# Patient Record
Sex: FEMALE | Race: BLACK OR AFRICAN AMERICAN | Employment: FULL TIME | URBAN - METROPOLITAN AREA
[De-identification: names, ages, dates, MRNs, and addresses within clinical notes are randomized per-mention and may not be internally consistent; named-entity substitution may affect disease eponyms.]

---

## 2021-07-26 ENCOUNTER — HOSPITAL ENCOUNTER (OUTPATIENT)
Dept: PREADMISSION TESTING | Age: 46
Discharge: HOME | End: 2021-07-26
Payer: SELF-PAY

## 2021-07-26 VITALS
BODY MASS INDEX: 37.77 KG/M2 | OXYGEN SATURATION: 100 % | WEIGHT: 235 LBS | DIASTOLIC BLOOD PRESSURE: 80 MMHG | HEART RATE: 65 BPM | SYSTOLIC BLOOD PRESSURE: 121 MMHG | HEIGHT: 66 IN | TEMPERATURE: 96.9 F | RESPIRATION RATE: 20 BRPM

## 2021-07-26 LAB
ALBUMIN SERPL-MCNC: 3.7 G/DL (ref 3.5–5)
ALBUMIN/GLOB SERPL: 1.2 {RATIO} (ref 1.1–2.2)
ALP SERPL-CCNC: 53 U/L (ref 45–117)
ALT SERPL-CCNC: 17 U/L (ref 12–78)
ANION GAP SERPL CALC-SCNC: 2 MMOL/L (ref 5–15)
APTT PPP: 26.2 SEC (ref 22.1–31)
AST SERPL-CCNC: 10 U/L (ref 15–37)
ATRIAL RATE: 55 BPM
BASOPHILS # BLD: 0.1 K/UL (ref 0–0.1)
BASOPHILS NFR BLD: 1 % (ref 0–1)
BILIRUB SERPL-MCNC: 0.3 MG/DL (ref 0.2–1)
BUN SERPL-MCNC: 11 MG/DL (ref 6–20)
BUN/CREAT SERPL: 13 (ref 12–20)
CALCIUM SERPL-MCNC: 8.5 MG/DL (ref 8.5–10.1)
CALCULATED P AXIS, ECG09: 44 DEGREES
CALCULATED R AXIS, ECG10: 36 DEGREES
CALCULATED T AXIS, ECG11: 17 DEGREES
CHLORIDE SERPL-SCNC: 109 MMOL/L (ref 97–108)
CO2 SERPL-SCNC: 29 MMOL/L (ref 21–32)
CREAT SERPL-MCNC: 0.84 MG/DL (ref 0.55–1.02)
DIAGNOSIS, 93000: NORMAL
DIFFERENTIAL METHOD BLD: NORMAL
EOSINOPHIL # BLD: 0.3 K/UL (ref 0–0.4)
EOSINOPHIL NFR BLD: 5 % (ref 0–7)
ERYTHROCYTE [DISTWIDTH] IN BLOOD BY AUTOMATED COUNT: 12 % (ref 11.5–14.5)
GLOBULIN SER CALC-MCNC: 3.1 G/DL (ref 2–4)
GLUCOSE SERPL-MCNC: 81 MG/DL (ref 65–100)
HCT VFR BLD AUTO: 39.2 % (ref 35–47)
HGB BLD-MCNC: 12.4 G/DL (ref 11.5–16)
IMM GRANULOCYTES # BLD AUTO: 0 K/UL (ref 0–0.04)
IMM GRANULOCYTES NFR BLD AUTO: 0 % (ref 0–0.5)
INR PPP: 1 (ref 0.9–1.1)
LYMPHOCYTES # BLD: 3 K/UL (ref 0.8–3.5)
LYMPHOCYTES NFR BLD: 43 % (ref 12–49)
MCH RBC QN AUTO: 29.9 PG (ref 26–34)
MCHC RBC AUTO-ENTMCNC: 31.6 G/DL (ref 30–36.5)
MCV RBC AUTO: 94.5 FL (ref 80–99)
MONOCYTES # BLD: 0.5 K/UL (ref 0–1)
MONOCYTES NFR BLD: 7 % (ref 5–13)
NEUTS SEG # BLD: 3 K/UL (ref 1.8–8)
NEUTS SEG NFR BLD: 44 % (ref 32–75)
NRBC # BLD: 0 K/UL (ref 0–0.01)
NRBC BLD-RTO: 0 PER 100 WBC
P-R INTERVAL, ECG05: 174 MS
PLATELET # BLD AUTO: 289 K/UL (ref 150–400)
PMV BLD AUTO: 10.1 FL (ref 8.9–12.9)
POTASSIUM SERPL-SCNC: 4.8 MMOL/L (ref 3.5–5.1)
PROT SERPL-MCNC: 6.8 G/DL (ref 6.4–8.2)
PROTHROMBIN TIME: 10.7 SEC (ref 9–11.1)
Q-T INTERVAL, ECG07: 430 MS
QRS DURATION, ECG06: 94 MS
QTC CALCULATION (BEZET), ECG08: 411 MS
RBC # BLD AUTO: 4.15 M/UL (ref 3.8–5.2)
SODIUM SERPL-SCNC: 140 MMOL/L (ref 136–145)
THERAPEUTIC RANGE,PTTT: NORMAL SECS (ref 58–77)
VENTRICULAR RATE, ECG03: 55 BPM
WBC # BLD AUTO: 6.9 K/UL (ref 3.6–11)

## 2021-07-26 PROCEDURE — 80053 COMPREHEN METABOLIC PANEL: CPT

## 2021-07-26 PROCEDURE — 85025 COMPLETE CBC W/AUTO DIFF WBC: CPT

## 2021-07-26 PROCEDURE — 93005 ELECTROCARDIOGRAM TRACING: CPT

## 2021-07-26 PROCEDURE — 85730 THROMBOPLASTIN TIME PARTIAL: CPT

## 2021-07-26 PROCEDURE — 36415 COLL VENOUS BLD VENIPUNCTURE: CPT

## 2021-07-26 PROCEDURE — 85610 PROTHROMBIN TIME: CPT

## 2021-07-26 RX ORDER — LEVOCETIRIZINE DIHYDROCHLORIDE 5 MG/1
5 TABLET, FILM COATED ORAL DAILY
COMMUNITY

## 2021-07-26 RX ORDER — GLUCOSAMINE SULFATE 1500 MG
2000 POWDER IN PACKET (EA) ORAL DAILY
COMMUNITY

## 2021-07-26 RX ORDER — ALBUTEROL SULFATE 90 UG/1
2 AEROSOL, METERED RESPIRATORY (INHALATION)
COMMUNITY

## 2021-07-26 NOTE — H&P
History and Physical    Patient: Dalila Adan MRN: [id number]  SSN: [id number]    YOB: 1975  Age: 41 y.o. Sex: female      CC: Cosmetic    Subjective: Dalila Adan is a 41 y.o. female referred for pre-operative evaluation by Dr. Tram Urbano for surgery on 21. Tammy Bhatt has a history of gastric sleeve surgery in 2018 and she now has excess skin around her abdominal area. She wishes to proceed with surgical intervention. She denies any cardiac history, no CP or SOB. She has mild asthma. The patient was evaluated in the surgeon's office and it was determined that the most appropriate plan of care is to proceed with surgical intervention. Patient's PCP Other, MD Randy    Past Medical History:   Diagnosis Date    COVID-19 vaccine series completed     Mild asthma      Past Surgical History:   Procedure Laterality Date    HX  SECTION      x 2    HX HYSTEROSCOPY WITH ENDOMETRIAL ABLATION      HX LAP GASTRIC BYPASS  2018      Family History   Problem Relation Age of Onset    Anesth Problems Neg Hx      Social History     Tobacco Use    Smoking status: Never Smoker    Smokeless tobacco: Never Used   Substance Use Topics    Alcohol use: Yes     Comment: occ    Drug use: Never      Prior to Admission medications    Medication Sig Start Date End Date Taking? Authorizing Provider   albuterol (PROVENTIL HFA, VENTOLIN HFA, PROAIR HFA) 90 mcg/actuation inhaler Take 2 Puffs by inhalation every six (6) hours as needed for Wheezing. Yes Provider, Historical   levocetirizine (Xyzal) 5 mg tablet Take 5 mg by mouth daily. Yes Provider, Historical   cholecalciferol (Vitamin D3) 25 mcg (1,000 unit) cap Take 2,000 Units by mouth daily.    Yes Provider, Historical        Allergies   Allergen Reactions    Nsaids (Non-Steroidal Anti-Inflammatory Drug) Other (comments)     Previous bariatric surgery 2018       Review of Systems:  Constitutional: Negative for chills and fever  HENT: Negative for congestion and sore throat  Eyes: negative for blurred vision and double vision  Respiratory: Negative for cough, shortness of breath and wheezing  Mouth: Negative for loose, broken or chipped teeth. Cardiovascular: Negative for chest pain and palpitations  Gastrointestinal: Negative for abdominal pain, constipation, diarrhea and nausea  Genitourinary: Negative for dysuria and hematuria  Musculoskeletal: Negative for joint pain  Skin: Negative for rash, open wounds. Neurological: Negative for dizziness, tremors and headaches  Psychiatric: Negative for anxiety    Objective:     Vitals:    07/26/21 1314   BP: 121/80   Pulse: 65   Resp: 20   Temp: 96.9 °F (36.1 °C)   SpO2: 100%   Weight: 106.6 kg (235 lb)   Height: 5' 6\" (1.676 m)        Physical Exam:  General Appearance: Alert, Well Appearing and in no distress  Mental Status: Normal mood, behavior, speech and dress  Neck: Normal appearance externally  Ears: External canal no drainage  Nose: Normal external appearance and no drainage   Chest: Clear to auscultation, no wheezes, rales or rhonchi  Heart: Normal rate, regular rhythm, no murmurs, rubs, clicks or gallops  Skin: Normal color, no lesions externally  Abdomen: Not examined  Neuro: Not examined  Musculoskeletal: Normal gait    Recent Results (from the past 36 hour(s))   CBC WITH AUTOMATED DIFF    Collection Time: 07/26/21  1:54 PM   Result Value Ref Range    WBC 6.9 3.6 - 11.0 K/uL    RBC 4.15 3.80 - 5.20 M/uL    HGB 12.4 11.5 - 16.0 g/dL    HCT 39.2 35.0 - 47.0 %    MCV 94.5 80.0 - 99.0 FL    MCH 29.9 26.0 - 34.0 PG    MCHC 31.6 30.0 - 36.5 g/dL    RDW 12.0 11.5 - 14.5 %    PLATELET 487 239 - 995 K/uL    MPV 10.1 8.9 - 12.9 FL    NRBC 0.0 0  WBC    ABSOLUTE NRBC 0.00 0.00 - 0.01 K/uL    NEUTROPHILS 44 32 - 75 %    LYMPHOCYTES 43 12 - 49 %    MONOCYTES 7 5 - 13 %    EOSINOPHILS 5 0 - 7 %    BASOPHILS 1 0 - 1 %    IMMATURE GRANULOCYTES 0 0.0 - 0.5 %    ABS.  NEUTROPHILS 3.0 1.8 - 8.0 K/UL ABS. LYMPHOCYTES 3.0 0.8 - 3.5 K/UL    ABS. MONOCYTES 0.5 0.0 - 1.0 K/UL    ABS. EOSINOPHILS 0.3 0.0 - 0.4 K/UL    ABS. BASOPHILS 0.1 0.0 - 0.1 K/UL    ABS. IMM. GRANS. 0.0 0.00 - 0.04 K/UL    DF AUTOMATED     METABOLIC PANEL, COMPREHENSIVE    Collection Time: 07/26/21  1:54 PM   Result Value Ref Range    Sodium 140 136 - 145 mmol/L    Potassium 4.8 3.5 - 5.1 mmol/L    Chloride 109 (H) 97 - 108 mmol/L    CO2 29 21 - 32 mmol/L    Anion gap 2 (L) 5 - 15 mmol/L    Glucose 81 65 - 100 mg/dL    BUN 11 6 - 20 MG/DL    Creatinine 0.84 0.55 - 1.02 MG/DL    BUN/Creatinine ratio 13 12 - 20      GFR est AA >60 >60 ml/min/1.73m2    GFR est non-AA >60 >60 ml/min/1.73m2    Calcium 8.5 8.5 - 10.1 MG/DL    Bilirubin, total 0.3 0.2 - 1.0 MG/DL    ALT (SGPT) 17 12 - 78 U/L    AST (SGOT) 10 (L) 15 - 37 U/L    Alk. phosphatase 53 45 - 117 U/L    Protein, total 6.8 6.4 - 8.2 g/dL    Albumin 3.7 3.5 - 5.0 g/dL    Globulin 3.1 2.0 - 4.0 g/dL    A-G Ratio 1.2 1.1 - 2.2     PROTHROMBIN TIME + INR    Collection Time: 07/26/21  1:54 PM   Result Value Ref Range    INR 1.0 0.9 - 1.1      Prothrombin time 10.7 9.0 - 11.1 sec   PTT    Collection Time: 07/26/21  1:54 PM   Result Value Ref Range    aPTT 26.2 22.1 - 31.0 sec    aPTT, therapeutic range     58.0 - 77.0 SECS   EKG, 12 LEAD, INITIAL    Collection Time: 07/26/21  2:25 PM   Result Value Ref Range    Ventricular Rate 55 BPM    Atrial Rate 55 BPM    P-R Interval 174 ms    QRS Duration 94 ms    Q-T Interval 430 ms    QTC Calculation (Bezet) 411 ms    Calculated P Axis 44 degrees    Calculated R Axis 36 degrees    Calculated T Axis 17 degrees    Diagnosis       Sinus bradycardia with sinus arrhythmia  Cannot rule out Anterior infarct , age undetermined  Abnormal ECG  No previous ECGs available         Assessment:     Cosmetic  Pre-Operative Evaluation    Plan:     Abdominoplasty  Labs and EKG reviewed.        Signed By: Bonita Hylton NP     July 26, 2021

## 2021-07-26 NOTE — PERIOP NOTES
[address] [address] [facility]   MAIN OR                                  [phone]   MAIN PRE OP                          [phone]                                                                                AMBULATORY PRE OP          [phone]  PRE-ADMISSION TESTING    [phone]   Surgery Date:  8/4/2021       Is surgery arrival time given by surgeon? YES  NO  If NO, [facility] staff will call you between 3 and 7pm the day before your surgery with your arrival time. (If your surgery is on a Monday, we will call you the Friday before.)    Call [phone] after 7pm Monday-Friday if you did not receive this call. INSTRUCTIONS BEFORE YOUR SURGERY   When You  Arrive Arrive at the [address] [facility] on the day of your surgery  Have your insurance card, photo ID, and any copayment (if needed)   Food   and   Drink NO food or drink after midnight the night before surgery    This means NO water, gum, mints, coffee, juice, etc.  No alcohol (beer, wine, liquor) 24 hours before and after surgery   Medications to   TAKE   Morning of Surgery MEDICATIONS TO TAKE THE MORNING OF SURGERY WITH A SIP OF WATER:    Bring inhaler   Medications  To  STOP      7 days before surgery  Non-Steroidal anti-inflammatory Drugs (NSAID's): for example, Ibuprofen (Advil, Motrin), Naproxen (Aleve)   Aspirin, if taking for pain    Herbal supplements, vitamins, and fish oil   Other:  (Pain medications not listed above, including Tylenol may be taken)   Blood  Thinners  If you take  Aspirin, Plavix, Coumadin, or any blood-thinning or anti-blood clot medicine, talk to the doctor who prescribed the medications for pre-operative instructions.    Bathing Clothing  Jewelry  Valuables      If you shower the morning of surgery, please do not apply anything to your skin (lotions, powders, deodorant, or makeup, especially mascara)   Follow [employer]lor[employer] body wash instructions provided to you during PAT appointment. Begin 3 days prior to surgery.  Do not shave or trim anywhere 24 hours before surgery   Wear your hair loose or down; no pony-tails, buns, or metal hair clips   Wear loose, comfortable, clean clothes   Wear glasses instead of contacts  [employer] money, valuables, and jewelry, including body piercings, at home   If you were given an [employer], bring it on day of surgery. Going Home - or Spending the Night  SAME-DAY SURGERY: You must have a responsible adult drive you home and stay with you 24 hours after surgery   ADMITS: If your doctor is keeping you in the hospital after surgery, leave personal belongings/luggage in your car until you have a hospital room number. Hospital discharge time is 12 noon  Drivers must be here before 12 noon unless you are told differently   Special Instructions none       Follow all instructions so your surgery wont be cancelled. Please, be on time. If a situation occurs and you are delayed the day of surgery, call [phone] . If your physical condition changes (like a fever, cold, flu, etc.) call your surgeon. Home medication(s) reviewed and verified via     LIST    during PAT appointment. The patient was contacted by     IN-PERSON  The patient verbalizes understanding of all instructions and     DOES NOT   need reinforcement.

## 2021-08-03 ENCOUNTER — ANESTHESIA EVENT (OUTPATIENT)
Dept: SURGERY | Age: 46
End: 2021-08-03
Payer: SELF-PAY

## 2021-08-04 ENCOUNTER — HOSPITAL ENCOUNTER (OUTPATIENT)
Age: 46
Discharge: HOME | End: 2021-08-05
Attending: SURGERY | Admitting: SURGERY
Payer: SELF-PAY

## 2021-08-04 ENCOUNTER — ANESTHESIA (OUTPATIENT)
Dept: SURGERY | Age: 46
End: 2021-08-04
Payer: SELF-PAY

## 2021-08-04 PROBLEM — Z41.1 ENCOUNTER FOR COSMETIC SURGERY: Status: ACTIVE | Noted: 2021-08-04

## 2021-08-04 LAB — HCG UR QL: NEGATIVE

## 2021-08-04 PROCEDURE — 77030013079 HC BLNKT BAIR HGGR 3M -A: Performed by: ANESTHESIOLOGY

## 2021-08-04 PROCEDURE — 74011000250 HC RX REV CODE- 250: Performed by: SURGERY

## 2021-08-04 PROCEDURE — 77030040361 HC SLV COMPR DVT MDII -B

## 2021-08-04 PROCEDURE — 74011250637 HC RX REV CODE- 250/637: Performed by: SURGERY

## 2021-08-04 PROCEDURE — 88302 TISSUE EXAM BY PATHOLOGIST: CPT

## 2021-08-04 PROCEDURE — 74011250636 HC RX REV CODE- 250/636: Performed by: SURGERY

## 2021-08-04 PROCEDURE — 77030008534 HC TBNG LIPOSUC BYRO -B: Performed by: SURGERY

## 2021-08-04 PROCEDURE — 77030038552 HC DRN WND MDII -A: Performed by: SURGERY

## 2021-08-04 PROCEDURE — 74011250636 HC RX REV CODE- 250/636: Performed by: NURSE ANESTHETIST, CERTIFIED REGISTERED

## 2021-08-04 PROCEDURE — 77030002916 HC SUT ETHLN J&J -A: Performed by: SURGERY

## 2021-08-04 PROCEDURE — 77030002933 HC SUT MCRYL J&J -A: Performed by: SURGERY

## 2021-08-04 PROCEDURE — 76060000037 HC ANESTHESIA 3 TO 3.5 HR: Performed by: SURGERY

## 2021-08-04 PROCEDURE — 81025 URINE PREGNANCY TEST: CPT

## 2021-08-04 PROCEDURE — 77030008463 HC STPLR SKN PROX J&J -B: Performed by: SURGERY

## 2021-08-04 PROCEDURE — 76210000000 HC OR PH I REC 2 TO 2.5 HR: Performed by: SURGERY

## 2021-08-04 PROCEDURE — 77030031139 HC SUT VCRL2 J&J -A: Performed by: SURGERY

## 2021-08-04 PROCEDURE — 77030019940 HC BLNKT HYPOTHRM STRY -B: Performed by: SURGERY

## 2021-08-04 PROCEDURE — 77030040922 HC BLNKT HYPOTHRM STRY -A

## 2021-08-04 PROCEDURE — 77030008684 HC TU ET CUF COVD -B: Performed by: NURSE ANESTHETIST, CERTIFIED REGISTERED

## 2021-08-04 PROCEDURE — 76010000133 HC OR TIME 3 TO 3.5 HR: Performed by: SURGERY

## 2021-08-04 PROCEDURE — C9290 INJ, BUPIVACAINE LIPOSOME: HCPCS | Performed by: SURGERY

## 2021-08-04 PROCEDURE — 77030002986 HC SUT PROL J&J -A: Performed by: SURGERY

## 2021-08-04 PROCEDURE — 77030038692 HC WND DEB SYS IRMX -B: Performed by: SURGERY

## 2021-08-04 PROCEDURE — 77030028700 HC BLD TISS PLSM MEDT -E: Performed by: SURGERY

## 2021-08-04 PROCEDURE — 74011250636 HC RX REV CODE- 250/636: Performed by: ANESTHESIOLOGY

## 2021-08-04 PROCEDURE — 74011000258 HC RX REV CODE- 258: Performed by: SURGERY

## 2021-08-04 PROCEDURE — 77030020061 HC IV BLD WRMR ADMIN SET 3M -B: Performed by: ANESTHESIOLOGY

## 2021-08-04 PROCEDURE — 77030002966 HC SUT PDS J&J -A: Performed by: SURGERY

## 2021-08-04 PROCEDURE — 77030036554: Performed by: SURGERY

## 2021-08-04 PROCEDURE — 77030005513 HC CATH URETH FOL11 MDII -B: Performed by: SURGERY

## 2021-08-04 PROCEDURE — 77030026438 HC STYL ET INTUB CARD -A: Performed by: NURSE ANESTHETIST, CERTIFIED REGISTERED

## 2021-08-04 PROCEDURE — 74011000250 HC RX REV CODE- 250: Performed by: NURSE ANESTHETIST, CERTIFIED REGISTERED

## 2021-08-04 PROCEDURE — 2709999900 HC NON-CHARGEABLE SUPPLY: Performed by: SURGERY

## 2021-08-04 PROCEDURE — 77030008526 HC TBNG ASPIR DISP MCRA -B: Performed by: SURGERY

## 2021-08-04 RX ORDER — DIPHENHYDRAMINE HYDROCHLORIDE 50 MG/ML
12.5 INJECTION, SOLUTION INTRAMUSCULAR; INTRAVENOUS AS NEEDED
Status: DISCONTINUED | OUTPATIENT
Start: 2021-08-04 | End: 2021-08-04 | Stop reason: HOSPADM

## 2021-08-04 RX ORDER — NEOSTIGMINE METHYLSULFATE 1 MG/ML
INJECTION, SOLUTION INTRAVENOUS AS NEEDED
Status: DISCONTINUED | OUTPATIENT
Start: 2021-08-04 | End: 2021-08-04 | Stop reason: HOSPADM

## 2021-08-04 RX ORDER — NALOXONE HYDROCHLORIDE 0.4 MG/ML
0.4 INJECTION, SOLUTION INTRAMUSCULAR; INTRAVENOUS; SUBCUTANEOUS AS NEEDED
Status: DISCONTINUED | OUTPATIENT
Start: 2021-08-04 | End: 2021-08-05 | Stop reason: HOSPADM

## 2021-08-04 RX ORDER — SODIUM CHLORIDE, SODIUM LACTATE, POTASSIUM CHLORIDE, CALCIUM CHLORIDE 600; 310; 30; 20 MG/100ML; MG/100ML; MG/100ML; MG/100ML
150 INJECTION, SOLUTION INTRAVENOUS CONTINUOUS
Status: DISCONTINUED | OUTPATIENT
Start: 2021-08-04 | End: 2021-08-04 | Stop reason: HOSPADM

## 2021-08-04 RX ORDER — OXYCODONE HYDROCHLORIDE 5 MG/1
5 TABLET ORAL
Status: DISCONTINUED | OUTPATIENT
Start: 2021-08-04 | End: 2021-08-05 | Stop reason: HOSPADM

## 2021-08-04 RX ORDER — MIDAZOLAM HYDROCHLORIDE 1 MG/ML
INJECTION, SOLUTION INTRAMUSCULAR; INTRAVENOUS AS NEEDED
Status: DISCONTINUED | OUTPATIENT
Start: 2021-08-04 | End: 2021-08-04 | Stop reason: HOSPADM

## 2021-08-04 RX ORDER — ENOXAPARIN SODIUM 100 MG/ML
40 INJECTION SUBCUTANEOUS EVERY 24 HOURS
Status: DISCONTINUED | OUTPATIENT
Start: 2021-08-04 | End: 2021-08-04

## 2021-08-04 RX ORDER — ONDANSETRON 2 MG/ML
4 INJECTION INTRAMUSCULAR; INTRAVENOUS
Status: DISCONTINUED | OUTPATIENT
Start: 2021-08-04 | End: 2021-08-05 | Stop reason: HOSPADM

## 2021-08-04 RX ORDER — FAMOTIDINE 10 MG/ML
INJECTION INTRAVENOUS AS NEEDED
Status: DISCONTINUED | OUTPATIENT
Start: 2021-08-04 | End: 2021-08-04 | Stop reason: HOSPADM

## 2021-08-04 RX ORDER — FENTANYL CITRATE 50 UG/ML
INJECTION, SOLUTION INTRAMUSCULAR; INTRAVENOUS AS NEEDED
Status: DISCONTINUED | OUTPATIENT
Start: 2021-08-04 | End: 2021-08-04 | Stop reason: HOSPADM

## 2021-08-04 RX ORDER — ACETAMINOPHEN 500 MG
1000 TABLET ORAL EVERY 6 HOURS
Status: DISCONTINUED | OUTPATIENT
Start: 2021-08-04 | End: 2021-08-05 | Stop reason: HOSPADM

## 2021-08-04 RX ORDER — GLYCOPYRROLATE 0.2 MG/ML
INJECTION INTRAMUSCULAR; INTRAVENOUS AS NEEDED
Status: DISCONTINUED | OUTPATIENT
Start: 2021-08-04 | End: 2021-08-04 | Stop reason: HOSPADM

## 2021-08-04 RX ORDER — SODIUM CHLORIDE 0.9 % (FLUSH) 0.9 %
5-40 SYRINGE (ML) INJECTION EVERY 8 HOURS
Status: DISCONTINUED | OUTPATIENT
Start: 2021-08-04 | End: 2021-08-05 | Stop reason: HOSPADM

## 2021-08-04 RX ORDER — LIDOCAINE HYDROCHLORIDE 10 MG/ML
0.1 INJECTION, SOLUTION EPIDURAL; INFILTRATION; INTRACAUDAL; PERINEURAL AS NEEDED
Status: DISCONTINUED | OUTPATIENT
Start: 2021-08-04 | End: 2021-08-04 | Stop reason: HOSPADM

## 2021-08-04 RX ORDER — DIPHENHYDRAMINE HYDROCHLORIDE 50 MG/ML
12.5 INJECTION, SOLUTION INTRAMUSCULAR; INTRAVENOUS
Status: DISCONTINUED | OUTPATIENT
Start: 2021-08-04 | End: 2021-08-05 | Stop reason: HOSPADM

## 2021-08-04 RX ORDER — PROPOFOL 10 MG/ML
INJECTION, EMULSION INTRAVENOUS AS NEEDED
Status: DISCONTINUED | OUTPATIENT
Start: 2021-08-04 | End: 2021-08-04 | Stop reason: HOSPADM

## 2021-08-04 RX ORDER — LIDOCAINE HYDROCHLORIDE 20 MG/ML
INJECTION, SOLUTION EPIDURAL; INFILTRATION; INTRACAUDAL; PERINEURAL AS NEEDED
Status: DISCONTINUED | OUTPATIENT
Start: 2021-08-04 | End: 2021-08-04 | Stop reason: HOSPADM

## 2021-08-04 RX ORDER — ALBUTEROL SULFATE 0.83 MG/ML
2.5 SOLUTION RESPIRATORY (INHALATION) AS NEEDED
Status: DISCONTINUED | OUTPATIENT
Start: 2021-08-04 | End: 2021-08-04 | Stop reason: HOSPADM

## 2021-08-04 RX ORDER — HYDROMORPHONE HYDROCHLORIDE 2 MG/ML
INJECTION, SOLUTION INTRAMUSCULAR; INTRAVENOUS; SUBCUTANEOUS AS NEEDED
Status: DISCONTINUED | OUTPATIENT
Start: 2021-08-04 | End: 2021-08-04 | Stop reason: HOSPADM

## 2021-08-04 RX ORDER — SUCCINYLCHOLINE CHLORIDE 20 MG/ML
INJECTION INTRAMUSCULAR; INTRAVENOUS AS NEEDED
Status: DISCONTINUED | OUTPATIENT
Start: 2021-08-04 | End: 2021-08-04 | Stop reason: HOSPADM

## 2021-08-04 RX ORDER — HYDROMORPHONE HYDROCHLORIDE 1 MG/ML
0.5 INJECTION, SOLUTION INTRAMUSCULAR; INTRAVENOUS; SUBCUTANEOUS
Status: DISCONTINUED | OUTPATIENT
Start: 2021-08-04 | End: 2021-08-05 | Stop reason: HOSPADM

## 2021-08-04 RX ORDER — HYDROMORPHONE HCL/0.9% NACL/PF 0.5 MG/ML
PLASTIC BAG, INJECTION (ML) INTRAVENOUS CONTINUOUS
Status: DISCONTINUED | OUTPATIENT
Start: 2021-08-04 | End: 2021-08-05 | Stop reason: HOSPADM

## 2021-08-04 RX ORDER — ROCURONIUM BROMIDE 10 MG/ML
INJECTION, SOLUTION INTRAVENOUS AS NEEDED
Status: DISCONTINUED | OUTPATIENT
Start: 2021-08-04 | End: 2021-08-04 | Stop reason: HOSPADM

## 2021-08-04 RX ORDER — SODIUM CHLORIDE, SODIUM LACTATE, POTASSIUM CHLORIDE, CALCIUM CHLORIDE 600; 310; 30; 20 MG/100ML; MG/100ML; MG/100ML; MG/100ML
125 INJECTION, SOLUTION INTRAVENOUS CONTINUOUS
Status: DISCONTINUED | OUTPATIENT
Start: 2021-08-04 | End: 2021-08-04 | Stop reason: HOSPADM

## 2021-08-04 RX ORDER — SODIUM CHLORIDE 0.9 % (FLUSH) 0.9 %
5-40 SYRINGE (ML) INJECTION AS NEEDED
Status: DISCONTINUED | OUTPATIENT
Start: 2021-08-04 | End: 2021-08-05 | Stop reason: HOSPADM

## 2021-08-04 RX ORDER — ONDANSETRON 2 MG/ML
4 INJECTION INTRAMUSCULAR; INTRAVENOUS AS NEEDED
Status: DISCONTINUED | OUTPATIENT
Start: 2021-08-04 | End: 2021-08-04 | Stop reason: HOSPADM

## 2021-08-04 RX ORDER — OXYCODONE HYDROCHLORIDE 5 MG/1
10 TABLET ORAL
Status: DISCONTINUED | OUTPATIENT
Start: 2021-08-04 | End: 2021-08-05 | Stop reason: HOSPADM

## 2021-08-04 RX ORDER — ACETAMINOPHEN 325 MG/1
650 TABLET ORAL
Status: DISCONTINUED | OUTPATIENT
Start: 2021-08-04 | End: 2021-08-05 | Stop reason: HOSPADM

## 2021-08-04 RX ORDER — HYDROMORPHONE HYDROCHLORIDE 1 MG/ML
0.5 INJECTION, SOLUTION INTRAMUSCULAR; INTRAVENOUS; SUBCUTANEOUS
Status: DISCONTINUED | OUTPATIENT
Start: 2021-08-04 | End: 2021-08-04 | Stop reason: HOSPADM

## 2021-08-04 RX ORDER — DEXAMETHASONE SODIUM PHOSPHATE 4 MG/ML
INJECTION, SOLUTION INTRA-ARTICULAR; INTRALESIONAL; INTRAMUSCULAR; INTRAVENOUS; SOFT TISSUE AS NEEDED
Status: DISCONTINUED | OUTPATIENT
Start: 2021-08-04 | End: 2021-08-04 | Stop reason: HOSPADM

## 2021-08-04 RX ORDER — AMOXICILLIN AND CLAVULANATE POTASSIUM 875; 125 MG/1; MG/1
TABLET, FILM COATED ORAL
COMMUNITY
Start: 2021-07-27 | End: 2021-08-19

## 2021-08-04 RX ORDER — ONDANSETRON 2 MG/ML
INJECTION INTRAMUSCULAR; INTRAVENOUS AS NEEDED
Status: DISCONTINUED | OUTPATIENT
Start: 2021-08-04 | End: 2021-08-04 | Stop reason: HOSPADM

## 2021-08-04 RX ORDER — ENOXAPARIN SODIUM 100 MG/ML
40 INJECTION SUBCUTANEOUS EVERY 24 HOURS
Status: DISCONTINUED | OUTPATIENT
Start: 2021-08-04 | End: 2021-08-05 | Stop reason: HOSPADM

## 2021-08-04 RX ADMIN — DEXAMETHASONE SODIUM PHOSPHATE 8 MG: 4 INJECTION, SOLUTION INTRAMUSCULAR; INTRAVENOUS at 13:05

## 2021-08-04 RX ADMIN — HYDROMORPHONE HYDROCHLORIDE 0.5 MG: 1 INJECTION, SOLUTION INTRAMUSCULAR; INTRAVENOUS; SUBCUTANEOUS at 16:38

## 2021-08-04 RX ADMIN — ROCURONIUM BROMIDE 20 MG: 10 INJECTION INTRAVENOUS at 14:32

## 2021-08-04 RX ADMIN — HYDROMORPHONE HYDROCHLORIDE 0.4 MG: 2 INJECTION INTRAMUSCULAR; INTRAVENOUS; SUBCUTANEOUS at 16:05

## 2021-08-04 RX ADMIN — ONDANSETRON HYDROCHLORIDE 4 MG: 2 SOLUTION INTRAMUSCULAR; INTRAVENOUS at 15:50

## 2021-08-04 RX ADMIN — HYDROMORPHONE HYDROCHLORIDE 0.6 MG: 2 INJECTION INTRAMUSCULAR; INTRAVENOUS; SUBCUTANEOUS at 16:15

## 2021-08-04 RX ADMIN — CEFAZOLIN SODIUM 2 G: 1 POWDER, FOR SOLUTION INTRAMUSCULAR; INTRAVENOUS at 13:16

## 2021-08-04 RX ADMIN — POTASSIUM CHLORIDE: 149 INJECTION, SOLUTION, CONCENTRATE INTRAVENOUS at 17:40

## 2021-08-04 RX ADMIN — FENTANYL CITRATE 100 MCG: 50 INJECTION, SOLUTION INTRAMUSCULAR; INTRAVENOUS at 12:55

## 2021-08-04 RX ADMIN — PROPOFOL 60 MCG/KG/MIN: 10 INJECTION, EMULSION INTRAVENOUS at 13:25

## 2021-08-04 RX ADMIN — MIDAZOLAM 2 MG: 1 INJECTION, SOLUTION INTRAMUSCULAR; INTRAVENOUS at 12:55

## 2021-08-04 RX ADMIN — ACETAMINOPHEN 1000 MG: 500 TABLET, FILM COATED ORAL at 19:25

## 2021-08-04 RX ADMIN — HYDROMORPHONE HYDROCHLORIDE 1 MG: 2 INJECTION INTRAMUSCULAR; INTRAVENOUS; SUBCUTANEOUS at 16:19

## 2021-08-04 RX ADMIN — HYDROMORPHONE HYDROCHLORIDE 0.5 MG: 1 INJECTION, SOLUTION INTRAMUSCULAR; INTRAVENOUS; SUBCUTANEOUS at 17:08

## 2021-08-04 RX ADMIN — LIDOCAINE HYDROCHLORIDE 100 MG: 20 INJECTION, SOLUTION EPIDURAL; INFILTRATION; INTRACAUDAL; PERINEURAL at 13:05

## 2021-08-04 RX ADMIN — ROCURONIUM BROMIDE 40 MG: 10 INJECTION INTRAVENOUS at 13:15

## 2021-08-04 RX ADMIN — SUCCINYLCHOLINE CHLORIDE 100 MG: 20 INJECTION, SOLUTION INTRAMUSCULAR; INTRAVENOUS at 13:05

## 2021-08-04 RX ADMIN — CEFAZOLIN 2 G: 1 INJECTION, POWDER, FOR SOLUTION INTRAMUSCULAR; INTRAVENOUS at 21:24

## 2021-08-04 RX ADMIN — FAMOTIDINE 20 MG: 10 INJECTION INTRAVENOUS at 13:15

## 2021-08-04 RX ADMIN — ROCURONIUM BROMIDE 10 MG: 10 INJECTION INTRAVENOUS at 13:05

## 2021-08-04 RX ADMIN — ENOXAPARIN SODIUM 40 MG: 40 INJECTION SUBCUTANEOUS at 22:52

## 2021-08-04 RX ADMIN — Medication: at 17:36

## 2021-08-04 RX ADMIN — GLYCOPYRROLATE 0.6 MG: 0.2 INJECTION INTRAMUSCULAR; INTRAVENOUS at 15:25

## 2021-08-04 RX ADMIN — Medication 5 MG: at 15:25

## 2021-08-04 RX ADMIN — PROPOFOL 200 MG: 10 INJECTION, EMULSION INTRAVENOUS at 13:05

## 2021-08-04 NOTE — ANESTHESIA PREPROCEDURE EVALUATION
Anesthetic History   No history of anesthetic complications            Review of Systems / Medical History  Patient summary reviewed and pertinent labs reviewed    Pulmonary            Asthma : well controlled       Neuro/Psych   Within defined limits           Cardiovascular  Within defined limits                     GI/Hepatic/Renal  Within defined limits              Endo/Other        Morbid obesity     Other Findings              Physical Exam    Airway  Mallampati: II    Neck ROM: normal range of motion   Mouth opening: Normal     Cardiovascular    Rhythm: regular  Rate: normal         Dental    Dentition: Lower dentition intact and Upper dentition intact     Pulmonary  Breath sounds clear to auscultation               Abdominal  GI exam deferred       Other Findings            Anesthetic Plan    ASA: 2  Anesthesia type: general          Induction: Intravenous  Anesthetic plan and risks discussed with: Patient

## 2021-08-04 NOTE — ANESTHESIA POSTPROCEDURE EVALUATION
Procedure(s):  FULL ABDOMINOPLASTY. general    Anesthesia Post Evaluation      Multimodal analgesia: multimodal analgesia not used between 6 hours prior to anesthesia start to PACU discharge  Patient location during evaluation: PACU  Patient participation: complete - patient participated  Level of consciousness: awake  Pain management: adequate  Airway patency: patent  Anesthetic complications: no  Cardiovascular status: acceptable, blood pressure returned to baseline and hemodynamically stable  Respiratory status: acceptable  Hydration status: acceptable  Post anesthesia nausea and vomiting:  controlled  Final Post Anesthesia Temperature Assessment:  Normothermia (36.0-37.5 degrees C)      INITIAL Post-op Vital signs:   Vitals Value Taken Time   /63 08/04/21 1707   Temp 36.4 °C (97.5 °F) 08/04/21 1615   Pulse 43 08/04/21 1709   Resp 22 08/04/21 1709   SpO2 100 % 08/04/21 1709   Vitals shown include unvalidated device data.

## 2021-08-05 VITALS
WEIGHT: 235 LBS | HEIGHT: 66 IN | DIASTOLIC BLOOD PRESSURE: 64 MMHG | TEMPERATURE: 98 F | OXYGEN SATURATION: 98 % | RESPIRATION RATE: 16 BRPM | SYSTOLIC BLOOD PRESSURE: 121 MMHG | HEART RATE: 98 BPM | BODY MASS INDEX: 37.77 KG/M2

## 2021-08-05 PROCEDURE — 74011250637 HC RX REV CODE- 250/637: Performed by: SURGERY

## 2021-08-05 PROCEDURE — 74011000250 HC RX REV CODE- 250: Performed by: SURGERY

## 2021-08-05 PROCEDURE — 74011250636 HC RX REV CODE- 250/636: Performed by: SURGERY

## 2021-08-05 RX ADMIN — POTASSIUM CHLORIDE: 149 INJECTION, SOLUTION, CONCENTRATE INTRAVENOUS at 04:00

## 2021-08-05 RX ADMIN — CEFAZOLIN 2 G: 1 INJECTION, POWDER, FOR SOLUTION INTRAMUSCULAR; INTRAVENOUS at 04:54

## 2021-08-05 RX ADMIN — OXYCODONE HYDROCHLORIDE 10 MG: 5 TABLET ORAL at 03:12

## 2021-08-05 RX ADMIN — ACETAMINOPHEN 1000 MG: 500 TABLET, FILM COATED ORAL at 11:50

## 2021-08-05 RX ADMIN — Medication: at 02:42

## 2021-08-05 RX ADMIN — OXYCODONE HYDROCHLORIDE 10 MG: 5 TABLET ORAL at 10:08

## 2021-08-05 RX ADMIN — ACETAMINOPHEN 1000 MG: 500 TABLET, FILM COATED ORAL at 05:32

## 2021-08-05 NOTE — PERIOP NOTES
Pt called this RN to room stating she felt \"something running down between my legs\". Upon assessment it was noted that left sided JUDD drain had some oozing at site. Dressing reinforced and JUDD emptied with 30 cc serosanguinous drainage.

## 2021-08-05 NOTE — DISCHARGE INSTRUCTIONS
Discharge abdominoplasty  Dr. Neftaly Levin, NP      Activities  Immediatly after  surgery you will rest quietly for the first 48 hours. You will be able to walk around the house and perform light daily activities; however, during this time, it is not at all unusual for you to feel some pain, soreness and pressure in the chest area. This will gradually subside and Dr. Zurdo Fay will give you pain medication to relieve it. Due to your tummy tuck many patients are unable to stand straight up to a few days to a week after surgery. This is due to the abdominal wall being pulled tight. This usually resolves quickly; however, during this time, it is not at all unusual for you to feel some pain and soreness in the treated area. This will gradually subside and     Drains: You may have one to three small incisions in you groin area from which small drain tubes are placed. These drains collect fluid under the skin normally produced after the procedure. The drains will remain for approximately five days and Dr. Zurdo Fay will remove them in the office during your follow up visits. You will be given a log to chart the drainage twice per day while the drains are in. Dr. Zurdo Fay and his staff will teach you to do this. You will be required to lay and sleep in a beach chair position with the head elevated, waist bent, legs elevated and knees bent. This will relieve pressure from your abdominal wall and make you feel better. You will sleep in the position for approximately seven days. You will have a dressing on your lower abdominal incision and belly button immediately after surgery. You will remove these bandages 48 hours after surgery and leave the incisions open to air. You may then shower and gently allow the soap and water to run over the incisions and drains.   You may use any normal over-the-counter anti-bacterial soap (Dove, Dial, etc.)    Exercise: No heavy exercise or lifting for a minimum of four weeks following surgery. Dr. Adriana Govea will then allow you to begin cardio exercises at three weeks and weight lifting at six weeks. This will allow the skin to adhere to it new position. Restricting exercise will allow the implants to remain in the proper position without movement. For the first three days following surgery you should try to restrict your arm movements. Move your arms slowly and avoid sudden jerky movements of the chest and breast area. Keep your arms as close to your body as possible. This allows the implants to remain in place. Dr. Adriana Govea encourages walking immediately after surgery. This activity will greatly minimize the risk of blood clots in your leg veins. Bathing: Do not get dressings wet. You will feel glue on your incisions. With your finger tips, gently wash over incisions. This daily routine will help keep the incisions clean, and will promote wound healing. The glue needs to stay on your incisions for 2 weeks time. After 2 weeks, you can start to pull off the glue if it has not fallen off on its own. Do not submerge yourself in a bath, swimming pool, or whirlpool for two weeks. Under garments: You will also have a abdominal binder that needs to be worn at all times except for showering for 2 weeks. You will get another, more comfortable abdominal binder in the office. The maximum swelling occurs at about three days and then begins to dramatically improve. Mild bruising typically resolves within 14 days. Medications:      Dilaudid: or Percocet  this is a your pain medication. Take one to two tablets as needed every 3-4 hours. No NSAIDS (advil, ibuprofen 5 days after surgery. This will increase your bleeding risk. Tylenol: (over the counter) 650 mg every 4 hours as needed for mild pain. Be careful because percocet has tylenol in it. You can not exceed 4000 mg a day of tylenol. Zofran: this is a medication for nausea.  Take this as needed for nausea every 6-8 hours. Make sure you drink plenty of fluids. Nausea usually resolves after the first 24 hours. Augmentin or Levaquin: this is your antibiotic. Take this medication completley until empty. Valium: this is for muscle relaxation. Your implant was placed beneath the pectoral muscle. This muscle can sometimes feel tight. Valium can help relax this muscle. Stool softeners: while taking narcotics, take a stool softener (over the counter) twice daily. Incease fluids, fiber. It is very important to keep your bowels regular while taking narcotics. Work: You should plan to be off work for 5-6 days, although this can vary from person to person. Follow up: Please present to Dr. Lashaun Osborn office at your scheduled appointment made prior to surgery. If you do not have an appt, please call the office ASAP to make your first post op visit. We like to see you within one to two days after surgery. Please notify Dr. Juan Miguel Carnes if:   If your one breast becomes significantly larger than the other   If you develop significant bruising across the chest    If you experience a significant increase in pain in the breast after the pain has improved   If you develop a temperature above 101.5° F   If you develop redness (like a sunburn) around your incisions  If you need help or have any questions feel free to call Dr Juan Miguel Carnes with your concerns. Dr. Juan Miguel Carnes is on call 24 hours per day, seven days per week and has an answering service to forward calls. The quality of your cosmetic enhancement may be compromised if you fail to return for any scheduled post-op visits, or follow the pre- and post-operative instructions. Please dont hesitate to report any unusual or concerning changes. Our number is [phone]. DRAIN LOG    Date/Time Right (ml) Left (ml)                                                                          Drain Care Instructions     Your surgery required the placement of drains to remove excess fluids from your wounds. You will notice clear tubes exiting your body connecting to small reservoirs. You will be taught how to care for the drains after surgery. 1)  Please empty the drains every twelve hours and record the volumes.  To do this, open the small lid on top of bulb and pour the drainage into a small container to measure. It is important that the volume of each drain is recorded separately. Every 24 hours total the individual drain outputs. When finished, squeeze the bulb and hold while replacing the lid. The bulb needs to be collapsed in order to be effective. After the volume becomes low enough the drains will be removed. 2) Please strip the drains every six hours while awake and as needed.  Stripping the drain tubes removes clots from the tubes and allows them to drain effectively. Stripping the tubes is very important to proper drain function. To do this, grasp the tubing close to your body with one hand and stabilize it. With the other hand, grasp the tubing below the first hand. Using an alcohol pad or lotion, pinch tubing tightly, sliding fingers down the tubing and away from your body; repeat this 2-3 times. It is okay if the tube becomes flat from the suction. 3) The drainage will initially be red in color and progressively become thinner in character and change to a pale red and eventually become clear or straw colored. The time required for these changes to occur varies between patients. 4) You may shower 48 hours after surgery. Hold the drains while in the shower and let soap and water run over the incision sites. Pat dry. DISCHARGE SUMMARY from your Nurse    The following personal items collected during your admission are returned to you:   Dental Appliance: Dental Appliances: None  Vision: Visual Aid: None  Hearing Aid:    Jewelry: Jewelry: None  Clothing: Clothing: Other (comment) (street clothes)  Other Valuables:  Other Valuables: None  Valuables sent to safe: Personal Items Sent to Safe: none    PATIENT INSTRUCTIONS:    After general anesthesia or intravenous sedation, for 24 hours or while taking prescription Narcotics:  · Limit your activities  · Do not drive and operate hazardous machinery  · Do not make important personal or business decisions  · Do  not drink alcoholic beverages  · If you have not urinated within 8 hours after discharge, please contact your surgeon on call. Report the following to your surgeon:  · Excessive pain, swelling, redness or odor of or around the surgical area  · Temperature over 100.5  · Nausea and vomiting lasting longer than 4 hours or if unable to take medications  · Any signs of decreased circulation or nerve impairment to extremity: change in color, persistent  numbness, tingling, coldness or increase pain  · Any questions    COUGH AND DEEP BREATHE    Breathing deep and coughing are very important exercises to do after surgery. Deep breathing and coughing open the little air tubes and air sacks in your lungs. You take deep breaths every day. You may not even notice - it is just something you do when you sigh or yawn. It is a natural exercise you do to keep these air passages open. After surgery, take deep breaths and cough, on purpose. Coughing and deep breathing help prevent bronchitis and pneumonia after surgery. If you had chest or belly surgery, use a pillow as a \"hug buddy\" and hold it tightly to your chest or belly when you cough. DIRECTIONS:  · Take 10 to 15 slow deep breaths every hour while awake. · Breathe in deeply, and hold it for 2 seconds. · Exhale slowly through puckered lips, like blowing up a balloon. · After every 4th or 5th deep breath, hug your pillow to your chest or belly and give a hard, deep cough. Yes, it will probably hurt. But doing this exercise is very important part of healing after surgery.   Take your pain medicine to help you do this exercise without too much pain. IF YOU HAVE BEEN DIAGNOSED WITH SLEEP APNEA, PLEASE USE YOUR SLEEP APNEA DEVICE OR CPAP MACHINE WHEN YOU INTEND TO NAP AFTER TAKING PAIN MEDICATION. Ankle Pumps    Ankle pumps increase the circulation of oxygenated blood to your lower extremities and decrease your risk for circulation problems such as blood clots. They also stretch the muscles, tendons and ligaments in your foot and ankle, and prevent joint contracture in the ankle and foot, especially after surgeries on the legs. It is important to do ankle pump exercises regularly after surgery because immobility increases your risk for developing a blood clot. Your doctor may also have you take an Aspirin for the next few days as well. If your doctor did not ask you to take an Aspirin, consult with him before starting Aspirin therapy on your own. Slowly point your foot forward, feeling the muscles on the top of your lower leg stretch, and hold this position for 5 seconds. Next, pull your foot back toward you as far as possible, stretching the calf muscles, and hold that position for 5 seconds. Repeat with the other foot. Perform 10 repetitions every hour while awake for both ankles if possible (down and then up with the foot once is one repetition). You should feel gentle stretching of the muscles in your lower leg when doing this exercise. If you feel pain, or your range of motion is limited, don't  Push too hard. Only go the limit your joint and muscles will let you go. If you have increasing pain, progressively worsening leg warmth or swelling, STOP the exercise and call your doctor.      Below is information about the medications your doctor is prescribing after your visit:    Other information in your discharge envelope:  []     PRESCRIPTIONS  []     SCHOOL/WORK NOTE  []     INFECTION PREVENTION  []     [address]  []     REGIONAL NERVE BLOCK ON QUE PAMPHLET   []     EXPAREL  []     HANDICAP APPLICATION         These are general instructions for a healthy lifestyle:    *  Please give a list of your current medications to your Primary Care Provider. *  Please update this list whenever your medications are discontinued, doses are      changed, or new medications (including over-the-counter products) are added. *  Please carry medication information at all times in case of emergency situations. About Smoking  No smoking / No tobacco products / Avoid exposure to second hand smoke    Surgeon General's Warning:  Quitting smoking now greatly reduces serious risk to your health. Obesity, smoking, and sedentary lifestyle greatly increases your risk for illness and disease. A healthy diet, regular physical exercise & weight monitoring are important for maintaining a healthy lifestyle. Congestive Heart Failure  You may be retaining fluid if you have a history of heart failure or if you experience any of the following symptoms:  Weight gain of 3 pounds or more overnight or 5 pounds in a week, increased swelling in our hands or feet or shortness of breath while lying flat in bed. Please call your doctor as soon as you notice any of these symptoms; do not wait until your next office visit. Recognize signs and symptoms of STROKE:  F - face looks uneven  A - arms unable to move or move even  S - speech slurred or non-existent  T - time-call 911 as soon as signs and symptoms begin-DO NOT go         Back to bed or wait to see if you get better-TIME IS BRAIN. Warning signs of HEART ATTACK  Call 911 if you have these symptoms    · Chest discomfort. Most heart attacks involve discomfort in the center of the chest that lasts more than a few minutes, or that goes away and comes back. It can feel like uncomfortable pressure, squeezing, fullness, or pain. · Discomfort in other areas of the upper body.        Symptoms can include pain or discomfort in one or both Arms, the back, neck, jaw, or stomach. ·  Shortness of breath with or without chest discomfort. · Other signs may include breaking out in a cold sweat, nausea, or lightheadedness    Don't wait more than five minutes to call 911 - MINUTES MATTER! Fast action can save your life. Calling 911 is almost always the fastest way to get lifesaving treatment. Emergency Medical Services staff can begin treatment when they arrive - up to an hour sooner than if someone gets to the hospital by car.

## 2021-08-05 NOTE — PERIOP NOTES
Report received from [facility], pt care assumed at this time. Pt resting,awakens easily to voice, reports pain is 3/10 at present and tolerable. VS updated, JPs and dressing checked, freeman draining, PO given, awaiting Dr Néstor Gutierrez for orders, pt denies questions/concerns at present. 0900 - Dr Flannery Forge to bedside for evaluation, OK to D/C freeman, and D/C pt when ready, JPs x 2 stay, abd binder in place, VSS, awaiting spouse for D/C.    1000 - Pt medicated as per [facility] for pain, no longer using PCA. Pt taking PO, crackers, juice, D/C papers placed at bedside for pt to review and ask questions. Continue to await spouse arrival for D/C, will monitor. 1055 - Spouse has arrived, D/C reviewed with pt and spouse, questions/concerns denied. 1120 - Pt ambulated to restroom, tolerated well, voided without problem, spouse assisting pt in dressing and bathing per her request, preparing for D/C.    1200 - Administered IV Ancef prior to D/C per pt request. At spouse's urging, pt now requesting to stay until 1300, reports \"we have a long ride home and I just want to make sure Im OK to leave\". Pt dressed, IV capped off, Pt taking PO, eating, call light within reach, spouse Francoise Riddle remains at bedside. 1330 - Called to room, pt reports that she is ready to leave. Continues to take PO well, reports pain is tolerable, denies need for any further medication, voided prior to leaving, assisted to car via wheelchair, pt tolerates movement well, spouse, Francoise Riddle attentive to needs, questions/concerns denied.

## 2021-08-05 NOTE — PERIOP NOTES
Report given to Maria Elena Mcdonald by [facility] RN.

## 2021-08-06 NOTE — PROGRESS NOTES
Plastic Surgery Progress note:      S: Patient doing well s/p full abdominoplasty. Pain was controlled well overnight. Nausea controlled. Tolerated diet well. O: VSS. Afebrile  Abdomen is soft and appropriately tender to palpation. Incisions clean, dry, and intact. JUDD drains intact bilaterally; drainage is appropriate. A: S/P full abdominoplasty    P:  1) Plan for discharge once pain is controlled on oral pain medications (DC IV narcotics),  tolerating diet  2) OOB to bathroom/ chair for meals  3) Nurses to teach JUDD drain care and lovenox administration   4) Pt to follow up as already scheduled visit with Dr. Keely Curry. 5) Pt already has prescriptions for lovenox, dilaudid, zofran, augmentin, as home. Instructions provided.

## 2021-08-06 NOTE — OP NOTES
[facility]  OPERATIVE REPORT    Name:  Gordon Connolly  MR#:  [id number]  :  1975  ACCOUNT #:  [de-identified]  DATE OF SERVICE:  2021    PREOPERATIVE DIAGNOSES:  1. Dermatolipodystrophy, abdominal wall. 2.  Rectus diastasis. POSTOPERATIVE DIAGNOSES:  1. Dermatolipodystrophy, abdominal wall. 2.  Rectus diastasis. 3.  Ventral hernia. PROCEDURES PERFORMED:  1. Abdominoplasty with rectus plication. 2.  Ventral hernia repair. SURGEON:  Savanna Lnada MD    ASSISTANT:  Flores Madsen MD    ANESTHESIA:  General endotracheal.    COMPLICATIONS:  None. SPECIMENS REMOVED:  1. Total excised tissue, abdominal wall, 2241 g.  2.  Ventral hernia sac. IMPLANTS:  None. ESTIMATED BLOOD LOSS:  100 mL    DRAINS:  19-Armenian Yuriy x2    FINDINGS:  Ventral hernia. COUNTS:  Correct x2. DISPOSITION:  Stable to PACU.    BRIEF HISTORY:  The patient is a 16-year-old female who underwent weight loss surgery in 2018. She now presents with dermatolipodystrophy of the abdominal wall, rectus diastasis, and she is requesting cosmetic enhancement of these areas. Full abdominoplasty with rectus plication is offered. The overall procedure, incisional approach, expected outcomes and recovery were discussed. The risks, benefits and alternatives to the procedure including but not limited to bleeding, infection, damage to surrounding tissue structures, the need for revisional surgery, seroma, hematoma, skin flap loss, fat necrosis, partial or total loss of the umbilicus, fat necrosis, hypertrophic scarring, asymmetry, PE, DVT, and fat embolism were discussed. She understood these risks and agreed to proceed. PROCEDURE:  The preoperative markings were made with the patient in the standing position. Informed consent was obtained. The patient was taken to the operating room and placed supine on the operating table. Sequential compression boots were placed.   General endotracheal anesthesia was obtained. A lower body Demetra Hugger was placed. The abdominal wall was prepped and draped in the usual sterile fashion. A Jones catheter was placed prior to prepping. Attention was then turned to the lower abdominal wall. The lower abdominal wall transverse incision was then made sharply. Dissection was carried down through the subcutaneous tissue and Narciso fascia to the level of the anterior abdominal wall fascia. The superiorly-based fasciocutaneous abdominal wall flap was then raised to the level of the umbilicus. Large perforating vessels were ligated with 2-0 Vicryl stick ties and electrocautery. The umbilicus was then incised circumferentially and dissected along its stalk directly down to the anterior abdominal wall fascia. The flap was then split sharply along the midline to the level of the umbilicus to aid in exposure. The flap was then further raised centrally along the anterior rectus sheath to the level of the xiphoid process centrally and laterally to the subcostal margin. At 3 cm superior to the umbilicus along the midline, a 5-cm midline ventral hernia was then encountered. The hernia sac was then opened carefully and contained preperitoneal fat and omentum. The tissue was then dissected from the hernia sac and allowed to return to the abdominal cavity. The hernia sac was then resected at the level of the anterior abdominal wall fascia and passed off the operating table for pathology. The hernia was then closed directly with interrupted 0 PDS sutures placed under direct vision. The hernia repair appeared satisfactory. The abdominal wall fascia was then examined. Significant laxity was seen with approximately 4 cm of diastasis seen throughout. Plication was therefore indicated. A fusiform plication pattern 7 cm wide at the level of the umbilicus was then designed.   This was then constructed using a #1 Prolene running imbricating suture from the xiphoid to the umbilicus and from the pubis to the umbilicus. The contour improvement appeared satisfactory. The entire wound bed was then irrigated with 4 L of saline solution followed by one bottle of Irrisept. Exparel 20 mL was expanded to 100 mL using sterile injectable normal saline. This mixture was injected into the anterior abdominal wall fascia to provide long-lasting local anesthesia. Two #19-Wolof Yuriy drains were brought out through inferior pubis stab incisions and secured to the skin with 3-0 nylon and placed within the wound bed. The patient was then placed in the beach chair position. The superior fasciocutaneous abdominal wall flap was transposed inferiorly to its new anatomic position. The upper resection lines were then marked and confirmed to close without substantial tension. The upper right and left wedges were then incised sharply and removed with the plasma blade. The right side weighed 1210 g and the left side weighed 1031 g for a total of 2241 g. Hemostasis was secured. The wounds were tailor tacked with surgical staples and definitively closed with interrupted 0 PDS suture on Narciso fascia followed by running deep dermal 2-0 Monocryl and running subcuticular 3-0 Monocryl. The umbilicus was then brought out through its previous anatomic position along the midline. A 2.5-cm oval-shaped incision was then designed, incised, and a core of skin and subcutaneous tissue was then removed. The umbilicus was then transposed to this new opening in proper orientation and inset with interrupted deep dermal 3-0 Monocryl and running subcuticular 3-0 Monocryl. The skin flap was perfusing well to light touch at the end of the case. The wounds were then dressed with Dermabond, 4 x 4s, Medipore tape. An abdominal binder was placed. Anesthesia was then discontinued. The patient extubated in the operating room having tolerated the procedure well.   The needle, instrument and laparotomy pad counts at the end of the case were correct.       Bertha Davenport MD      [id number]  D:  08/06/2021 6:35  T:  08/06/2021 14:35  JOB #:  [id number]

## 2022-10-10 NOTE — PROGRESS NOTES
New Patient, Referred by Dr. Andrews Began for surgical consult, BRCA positive    1. Have you been to the ER, urgent care clinic since your last visit? Hospitalized since your last visit?  no    2. Have you seen or consulted any other health care providers outside of the [address] [place] [address] since your last visit? Include any pap smears or colon screening.    Yes, Dr. Andrews Began

## 2022-10-12 ENCOUNTER — OFFICE VISIT (OUTPATIENT)
Dept: GYNECOLOGY | Age: 47
End: 2022-10-12
Payer: COMMERCIAL

## 2022-10-12 VITALS
HEART RATE: 78 BPM | SYSTOLIC BLOOD PRESSURE: 122 MMHG | DIASTOLIC BLOOD PRESSURE: 85 MMHG | HEIGHT: 66 IN | BODY MASS INDEX: 39.37 KG/M2 | WEIGHT: 245 LBS

## 2022-10-12 DIAGNOSIS — Z15.01 BRCA2 GENE MUTATION POSITIVE IN FEMALE: Primary | ICD-10-CM

## 2022-10-12 DIAGNOSIS — Z15.02 BRCA2 GENE MUTATION POSITIVE IN FEMALE: Primary | ICD-10-CM

## 2022-10-12 DIAGNOSIS — Z15.09 BRCA2 GENE MUTATION POSITIVE IN FEMALE: Primary | ICD-10-CM

## 2022-10-12 PROBLEM — G43.909 MIGRAINES: Status: ACTIVE | Noted: 2022-10-12

## 2022-10-12 PROBLEM — J45.909 MILD ASTHMA: Status: ACTIVE | Noted: 2022-10-12

## 2022-10-12 PROCEDURE — 99205 OFFICE O/P NEW HI 60 MIN: CPT | Performed by: OBSTETRICS & GYNECOLOGY

## 2022-10-12 NOTE — PROGRESS NOTES
[address] [place] [address] [address]ias Moritz [address], [address]  P [phone]  F [phone]    Office Note  Patient ID:  Name:  Janice Mccrary  MRN:  [id number]  :  1975/47 y.o. Date:  10/12/2022      HISTORY OF PRESENT ILLNESS:  Ms. Janice Mccrary is a 56 y.o. female who presents as a new patient from Dr. Mir Garcia for germline BRCA-2 mutation (heterozygous for c.6124C>T ([id number]). The patient's family history is significant for breast cancer, which led to her obtaining genetic testing. Referred to our office for further discussion and management. She is otherwise without complaints. Imaging Review:   Pelvic ultrasound 2022:   Impression: Uterus present, mid division, size 8.9cm. Endometrium 4.4mm. Cervix normal and culdesac no fluid. IUD is noted in expected position. Unable to discretely visualize right ovary, but no adnexal mass or cyst.  Unable to discretely visualize left ovary, but no adnexal mass or cyst.     ROS:  A comprehensive review of systems was negative except for that written in the History of Present Illness.  , 10 point ROS      ECOG stGstrstastdstest:st st1st Problem List:  Patient Active Problem List    Diagnosis Date Noted    Migraines 10/12/2022    Mild asthma 10/12/2022    BRCA2 gene mutation positive in female 10/12/2022    Encounter for cosmetic surgery 2021     PMH:  Past Medical History:   Diagnosis Date    COVID-19 vaccine series completed     Hx of seasonal allergies     Migraines     Mild asthma       PSH:  Past Surgical History:   Procedure Laterality Date    HX  SECTION      x 2    HX HYSTEROSCOPY WITH ENDOMETRIAL ABLATION      HX LAP GASTRIC BYPASS  2018    HX OTHER SURGICAL      abdominalplasty      Social History:  Social History     Tobacco Use    Smoking status: Never    Smokeless tobacco: Never   Substance Use Topics    Alcohol use: Yes     Comment: occ      Family History:  Family History   Problem Relation Age of Onset Breast Cancer Mother     Breast Cancer Maternal Aunt         X2    Anesth Problems Neg Hx       Medications: (reviewed)  Current Outpatient Medications   Medication Sig    albuterol (PROVENTIL HFA, VENTOLIN HFA, PROAIR HFA) 90 mcg/actuation inhaler Take 2 Puffs by inhalation every six (6) hours as needed for Wheezing. levocetirizine (XYZAL) 5 mg tablet Take 5 mg by mouth daily. cholecalciferol (VITAMIN D3) 25 mcg (1,000 unit) cap Take 2,000 Units by mouth daily. No current facility-administered medications for this visit. Allergies: (reviewed)  Allergies   Allergen Reactions    Nsaids (Non-Steroidal Anti-Inflammatory Drug) Other (comments)     Previous bariatric surgery 2018        Gyn History:   Last pap: 10/2020 normal per patient  History of abnormal pap: denies    OBJECTIVE:    Physical Exam:  VITAL SIGNS: Vitals:    10/12/22 1046   BP: 122/85   Pulse: 78   Weight: 245 lb (111.1 kg)   Height: 5' 6\" (1.676 m)     Body mass index is 39.54 kg/m². GENERAL TERRI: Conversant, alert, oriented. No acute distress. HEENT: HEENT. No thyroid enlargement. No JVD. Neck: Supple without restrictions. RESPIRATORY: Clear to auscultation and percussion to the bases. No CVAT. CARDIOVASC: RRR without murmur/rub. GASTROINT: soft, non-tender, without masses or organomegaly   MUSCULOSKEL: no joint tenderness, deformity or swelling       EXTREMITIES: extremities normal, atraumatic, no cyanosis or edema   PELVIC: Exam chaperoned by nurse. Normal appearing external genitalia. On speculum exam, normal appearing vagina and cervix. On bimanual exam, the cervix and uterus are normal size and mobile. No evidence of adnexal masses or nodularity. RECTAL: deferred   DARRICK SURVEY: No suspicious lymphadenopathy or edema noted. NEURO: Grossly intact. No acute deficit.        Lab Date as available:    Lab Results   Component Value Date/Time    WBC 6.9 07/26/2021 01:54 PM    HGB 12.4 07/26/2021 01:54 PM    HCT 39.2 07/26/2021 01:54 PM    PLATELET 727 83/27/4862 01:54 PM    MCV 94.5 07/26/2021 01:54 PM     Lab Results   Component Value Date/Time    Sodium 140 07/26/2021 01:54 PM    Potassium 4.8 07/26/2021 01:54 PM    Chloride 109 (H) 07/26/2021 01:54 PM    CO2 29 07/26/2021 01:54 PM    Anion gap 2 (L) 07/26/2021 01:54 PM    Glucose 81 07/26/2021 01:54 PM    BUN 11 07/26/2021 01:54 PM    Creatinine 0.84 07/26/2021 01:54 PM    BUN/Creatinine ratio 13 07/26/2021 01:54 PM    GFR est AA >60 07/26/2021 01:54 PM    GFR est non-AA >60 07/26/2021 01:54 PM    Calcium 8.5 07/26/2021 01:54 PM         IMPRESSION/PLAN:    Ms. Douglas Wilson is a 56 y.o. female who presents with germline BRCA-2 mutation (heterozygous for c.6124C>T ([id number]). Family History of breast cancer    Problems:     Patient Active Problem List    Diagnosis Date Noted    Migraines 10/12/2022    Mild asthma 10/12/2022    BRCA2 gene mutation positive in female 10/12/2022    Encounter for cosmetic surgery 08/04/2021       I reviewed Ms. Shad Murillo's course to date, including her medical records, recent studies, physical exam, and review of symptoms. Reviewed NCCN guidelines and recommendations for germline BRCA-2 mutations. Provided patient with reading material from NCCN. Discussed her increased risk of ovarian cancer, approximately 15-40%. Discussed guidelines for surveillance versus risk-reducing BSO. Given the patient's age and the fact that she is done with child-bearing, I have recommended a laparoscopic risk-reducing BSO. The patient inquired about a hysterectomy. There is no increased risk of endometrial/uterine cancer with BRCA-2 mutations, and it is unlikely for insurance to approve a hysterectomy as she has no other indications for a hysterectomy. Plan for laparoscopic BSO, possible laparotomy. Reviewed risks, benefits, indications, and alternatives of surgery. Will post for surgery at the patient's convenience.  Will collect CBC, CMP, CXR, and EKG prior to surgery. All questions and concerns were addressed with the patient and she is comfortable with the plan.      Defined Sensitive Document    >50% of total time allocated to visit dedicated to counseling, 60 minutes total.    Signed By: Janita Schaumann, MD     10/12/2022/11:16 AM

## 2023-01-09 ENCOUNTER — HOSPITAL ENCOUNTER (OUTPATIENT)
Dept: PREADMISSION TESTING | Age: 48
Discharge: HOME | End: 2023-01-09
Payer: COMMERCIAL

## 2023-01-09 ENCOUNTER — HOSPITAL ENCOUNTER (OUTPATIENT)
Dept: GENERAL RADIOLOGY | Age: 48
Discharge: HOME | End: 2023-01-09
Attending: OBSTETRICS & GYNECOLOGY
Payer: COMMERCIAL

## 2023-01-09 VITALS
HEIGHT: 66 IN | DIASTOLIC BLOOD PRESSURE: 89 MMHG | WEIGHT: 252.21 LBS | SYSTOLIC BLOOD PRESSURE: 137 MMHG | BODY MASS INDEX: 40.53 KG/M2 | TEMPERATURE: 98.5 F | HEART RATE: 73 BPM

## 2023-01-09 LAB
ALBUMIN SERPL-MCNC: 3.7 G/DL (ref 3.5–5)
ALBUMIN/GLOB SERPL: 1.1 (ref 1.1–2.2)
ALP SERPL-CCNC: 70 U/L (ref 45–117)
ALT SERPL-CCNC: 17 U/L (ref 12–78)
ANION GAP SERPL CALC-SCNC: 3 MMOL/L (ref 5–15)
AST SERPL-CCNC: 12 U/L (ref 15–37)
BASOPHILS # BLD: 0 K/UL (ref 0–0.1)
BASOPHILS NFR BLD: 0 % (ref 0–1)
BILIRUB SERPL-MCNC: 0.3 MG/DL (ref 0.2–1)
BUN SERPL-MCNC: 13 MG/DL (ref 6–20)
BUN/CREAT SERPL: 15 (ref 12–20)
CALCIUM SERPL-MCNC: 9.3 MG/DL (ref 8.5–10.1)
CHLORIDE SERPL-SCNC: 107 MMOL/L (ref 97–108)
CO2 SERPL-SCNC: 29 MMOL/L (ref 21–32)
CREAT SERPL-MCNC: 0.84 MG/DL (ref 0.55–1.02)
DIFFERENTIAL METHOD BLD: NORMAL
EOSINOPHIL # BLD: 0.3 K/UL (ref 0–0.4)
EOSINOPHIL NFR BLD: 4 % (ref 0–7)
ERYTHROCYTE [DISTWIDTH] IN BLOOD BY AUTOMATED COUNT: 12.4 % (ref 11.5–14.5)
GLOBULIN SER CALC-MCNC: 3.5 G/DL (ref 2–4)
GLUCOSE SERPL-MCNC: 94 MG/DL (ref 65–100)
HCT VFR BLD AUTO: 39.1 % (ref 35–47)
HGB BLD-MCNC: 12.5 G/DL (ref 11.5–16)
IMM GRANULOCYTES # BLD AUTO: 0 K/UL (ref 0–0.04)
IMM GRANULOCYTES NFR BLD AUTO: 0 % (ref 0–0.5)
LYMPHOCYTES # BLD: 3.1 K/UL (ref 0.8–3.5)
LYMPHOCYTES NFR BLD: 39 % (ref 12–49)
MCH RBC QN AUTO: 29.4 PG (ref 26–34)
MCHC RBC AUTO-ENTMCNC: 32 G/DL (ref 30–36.5)
MCV RBC AUTO: 92 FL (ref 80–99)
MONOCYTES # BLD: 0.5 K/UL (ref 0–1)
MONOCYTES NFR BLD: 6 % (ref 5–13)
NEUTS SEG # BLD: 4.1 K/UL (ref 1.8–8)
NEUTS SEG NFR BLD: 51 % (ref 32–75)
NRBC # BLD: 0 K/UL (ref 0–0.01)
NRBC BLD-RTO: 0 PER 100 WBC
PLATELET # BLD AUTO: 317 K/UL (ref 150–400)
PMV BLD AUTO: 10 FL (ref 8.9–12.9)
POTASSIUM SERPL-SCNC: 4.2 MMOL/L (ref 3.5–5.1)
PROT SERPL-MCNC: 7.2 G/DL (ref 6.4–8.2)
RBC # BLD AUTO: 4.25 M/UL (ref 3.8–5.2)
SODIUM SERPL-SCNC: 139 MMOL/L (ref 136–145)
WBC # BLD AUTO: 8 K/UL (ref 3.6–11)

## 2023-01-09 PROCEDURE — 93005 ELECTROCARDIOGRAM TRACING: CPT

## 2023-01-09 PROCEDURE — 85025 COMPLETE CBC W/AUTO DIFF WBC: CPT

## 2023-01-09 PROCEDURE — 80053 COMPREHEN METABOLIC PANEL: CPT

## 2023-01-09 PROCEDURE — 71046 X-RAY EXAM CHEST 2 VIEWS: CPT

## 2023-01-09 PROCEDURE — 36415 COLL VENOUS BLD VENIPUNCTURE: CPT

## 2023-01-09 RX ORDER — FLUTICASONE FUROATE, UMECLIDINIUM BROMIDE AND VILANTEROL TRIFENATATE 100; 62.5; 25 UG/1; UG/1; UG/1
1 POWDER RESPIRATORY (INHALATION) DAILY
COMMUNITY

## 2023-01-09 NOTE — PERIOP NOTES
COPY OF COVID CARD ON CHART          PATIENT GIVEN WRITTEN INSTRUCTIONS TO GO TO THE IMAGING CENTER/CANCER CENTER ON [address] TO HAVE CHEST XRAY COMPLETED.

## 2023-01-09 NOTE — PERIOP NOTES
[address] INSTRUCTIONS    Surgery Date:   1/16/23    Your surgeon's office or [facility] staff will call you between 4 PM- 8 PM the day before surgery with your arrival time. If your surgery is on a Monday, you will receive a call the preceding Friday. Please report to [facility] Patient Access/Admitting on the 1st floor. Bring your insurance card, photo identification, and any copayment ( if applicable). If you are going home the same day of your surgery, you must have a responsible adult to drive you home. You need to have a responsible adult to stay with you the first 24 hours after surgery and you should not drive a car for 24 hours following your surgery. Nothing to eat or drink after midnight the night before surgery. This includes no water, gum, mints, coffee, juice, etc.  Please note special instructions, if applicable, below for medications. Do NOT drink alcohol or smoke 24 hours before surgery. STOP smoking for 14 days prior as it helps with breathing and healing after surgery. If you are being admitted to the hospital, please leave personal belongings/luggage in your car until you have an assigned hospital room number. Please wear comfortable clothes. Wear your glasses instead of contacts. We ask that all money, jewelry and valuables be left at home. Wear no make up, particularly mascara, the day of surgery. All body piercings, rings, and jewelry need to be removed and left at home. Please remove any nail polish or artificial nails from your fingernails. Please wear your hair loose or down. Please no pony-tails, buns, or any metal hair accessories. If you shower the morning of surgery, please do not apply any lotions or powders afterwards. You may wear deodorant, unless having breast surgery. Do not shave any body area within 24 hours of your surgery. Please follow all instructions to avoid any potential surgical cancellation.   Should your physical condition change, (i.e. fever, cold, flu, etc.) please notify your surgeon as soon as possible. It is important to be on time. If a situation occurs where you may be delayed, please call:  [phone] / [phone] on the day of surgery. The Preadmission Testing staff can be reached at [phone]. Special instructions:       Current Outpatient Medications   Medication Sig    fluticasone-umeclidinium-vilanterol (Trelegy Ellipta) 100-62.5-25 mcg inhaler Take 1 Puff by inhalation daily. albuterol (PROVENTIL HFA, VENTOLIN HFA, PROAIR HFA) 90 mcg/actuation inhaler Take 2 Puffs by inhalation every six (6) hours as needed for Wheezing. levocetirizine (XYZAL) 5 mg tablet Take 5 mg by mouth daily. cholecalciferol (VITAMIN D3) 25 mcg (1,000 unit) cap Take 2,000 Units by mouth daily. No current facility-administered medications for this encounter. YOU MUST ONLY TAKE THESE MEDICATIONS THE MORNING OF SURGERY WITH A SIP OF WATER: INHALERS   MEDICATIONS TO TAKE THE MORNING OF SURGERY ONLY IF NEEDED:   HOLD these prescription medications BEFORE Surgery:   Ask your surgeon/prescribing physician about when/if to STOP taking these medications:   Stop all vitamins, herbal medicines and Aspirin containing products 7 days prior to surgery. Stop any non-steroidal anti-inflammatory drugs (i.e. Ibuprofen, Naproxen, Advil, Aleve) 3 days before surgery. You may take Tylenol. STOP VITAMIN D 1/9/23  If you are currently taking Plavix, Coumadin, or any other blood-thinning/anticoagulant medication contact your prescribing physician for instructions. Preventing Infections Before and After - Your Surgery    IMPORTANT INSTRUCTIONS      You play an important role in your health and preparation for surgery. To reduce the germs on your skin you will need to shower with CHG soap (Chorhexidine gluconate 4%) two times before surgery.     CHG soap (Hibiclens, Hex-A-Clens or store brand)  CHG soap will be provided at your Preadmission Testing (PAT) appointment. If you do not have a PAT appointment before surgery, you may arrange to  CHG soap from our office or purchase CHG soap at a pharmacy, grocery or department store. You need to purchase TWO 4 ounce bottles to use for your 2 showers. Steps to follow:  Nahomy Pen your hair with your normal shampoo and your body with regular soap and rinse well to remove shampoo and soap from your skin. Wet a clean washcloth and turn off the shower. Put CHG soap on washcloth and apply to your entire body from the neck down. Do not use on your head, face or private parts(genitals). Do not use CHG soap on open sores, wounds or areas of skin irritation. Wash you body gently for 5 minutes. Do not wash your skin too hard. This soap does not create lather. Pay special attention to your underarms and from your belly button to your feet. Turn the shower back on and rinse well to get CHG soap off your body. Pat your skin dry with a clean, dry towel. Do not apply lotions or moisturizer. Put on clean clothes and sleep on fresh bed sheets and do not allow pets to sleep with you. Shower with CHG soap 2 times before your surgery  The evening before your surgery  The morning of your surgery      Tips to help prevent infections after your surgery:  Protect your surgical wound from germs:  Hand washing is the most important thing you and your caregivers can do to prevent infections. Keep your bandage clean and dry! Do not touch your surgical wound. Use clean, freshly washed towels and washcloths every time you shower; do not share bath linens with others. Until your surgical wound is healed, wear clothing and sleep on bed linens each day that are clean and freshly washed. Do not allow pets to sleep in your bed with you or touch your surgical wound. Do not smoke - smoking delays wound healing. This may be a good time to stop smoking.   If you have diabetes, it is important for you to manage your blood sugar levels properly before your surgery as well as after your surgery. Poorly managed blood sugar levels slow down wound healing and prevent you from healing completely. Patient Information Regarding COVID Restrictions    Day of Procedure    Please park in the parking deck or any designated visitor parking lot. Enter the facility through the Main Entrance of the hospital.  On the day of surgery, please provide the cell phone number of the person who will be waiting for you to the Patient Access representative at the time of registration. Masks are highly recommended in the hospital, but not required. Once your procedure and the immediate recovery period is completed, a nurse in the recovery area will contact your designated visitor to inform them of your room number or to otherwise review other pertinent information regarding your care. Social distancing practices are strongly encouraged in waiting areas and the cafeteria. The patient was contacted  in person. She verbalized understanding of all instructions does not  need reinforcement.

## 2023-01-10 LAB
ATRIAL RATE: 57 BPM
CALCULATED P AXIS, ECG09: 41 DEGREES
CALCULATED R AXIS, ECG10: 21 DEGREES
CALCULATED T AXIS, ECG11: 12 DEGREES
DIAGNOSIS, 93000: NORMAL
P-R INTERVAL, ECG05: 184 MS
Q-T INTERVAL, ECG07: 432 MS
QRS DURATION, ECG06: 102 MS
QTC CALCULATION (BEZET), ECG08: 420 MS
VENTRICULAR RATE, ECG03: 57 BPM

## 2023-01-16 ENCOUNTER — HOSPITAL ENCOUNTER (OUTPATIENT)
Age: 48
Setting detail: OUTPATIENT SURGERY
Discharge: HOME | End: 2023-01-16
Attending: OBSTETRICS & GYNECOLOGY | Admitting: OBSTETRICS & GYNECOLOGY
Payer: COMMERCIAL

## 2023-01-16 ENCOUNTER — ANESTHESIA EVENT (OUTPATIENT)
Dept: SURGERY | Age: 48
End: 2023-01-16
Payer: COMMERCIAL

## 2023-01-16 ENCOUNTER — ANESTHESIA (OUTPATIENT)
Dept: SURGERY | Age: 48
End: 2023-01-16
Payer: COMMERCIAL

## 2023-01-16 VITALS
RESPIRATION RATE: 24 BRPM | SYSTOLIC BLOOD PRESSURE: 148 MMHG | HEART RATE: 73 BPM | TEMPERATURE: 98 F | DIASTOLIC BLOOD PRESSURE: 87 MMHG | WEIGHT: 252 LBS | BODY MASS INDEX: 40.5 KG/M2 | OXYGEN SATURATION: 97 % | HEIGHT: 66 IN

## 2023-01-16 DIAGNOSIS — G89.18 POSTOPERATIVE PAIN: Primary | ICD-10-CM

## 2023-01-16 DIAGNOSIS — Z15.09 BRCA2 GENE MUTATION POSITIVE IN FEMALE: ICD-10-CM

## 2023-01-16 DIAGNOSIS — Z15.02 BRCA2 GENE MUTATION POSITIVE IN FEMALE: ICD-10-CM

## 2023-01-16 DIAGNOSIS — Z15.01 BRCA2 GENE MUTATION POSITIVE IN FEMALE: ICD-10-CM

## 2023-01-16 LAB — HCG UR QL: NEGATIVE

## 2023-01-16 PROCEDURE — 76210000021 HC REC RM PH II 0.5 TO 1 HR: Performed by: OBSTETRICS & GYNECOLOGY

## 2023-01-16 PROCEDURE — 76010000162 HC OR TIME 1.5 TO 2 HR INTENSV-TIER 1: Performed by: OBSTETRICS & GYNECOLOGY

## 2023-01-16 PROCEDURE — 77030031139 HC SUT VCRL2 J&J -A: Performed by: OBSTETRICS & GYNECOLOGY

## 2023-01-16 PROCEDURE — 74011250636 HC RX REV CODE- 250/636: Performed by: OBSTETRICS & GYNECOLOGY

## 2023-01-16 PROCEDURE — 74011250637 HC RX REV CODE- 250/637

## 2023-01-16 PROCEDURE — 77030026438 HC STYL ET INTUB CARD -A: Performed by: STUDENT IN AN ORGANIZED HEALTH CARE EDUCATION/TRAINING PROGRAM

## 2023-01-16 PROCEDURE — 77030012799 HC TRCR GELPRT BLN AMR -B: Performed by: OBSTETRICS & GYNECOLOGY

## 2023-01-16 PROCEDURE — 74011250636 HC RX REV CODE- 250/636: Performed by: STUDENT IN AN ORGANIZED HEALTH CARE EDUCATION/TRAINING PROGRAM

## 2023-01-16 PROCEDURE — 77030008602 HC TRCR ENDOSC EPATH J&J -B: Performed by: OBSTETRICS & GYNECOLOGY

## 2023-01-16 PROCEDURE — 77030007955 HC PCH ENDOSC SPEC J&J -B: Performed by: OBSTETRICS & GYNECOLOGY

## 2023-01-16 PROCEDURE — 77030008684 HC TU ET CUF COVD -B: Performed by: STUDENT IN AN ORGANIZED HEALTH CARE EDUCATION/TRAINING PROGRAM

## 2023-01-16 PROCEDURE — 77030039895 HC SYST SMK EVAC LAP COVD -B: Performed by: OBSTETRICS & GYNECOLOGY

## 2023-01-16 PROCEDURE — 77030039147 HC PWDR HEMSTS SURGICEL JNJ -D: Performed by: OBSTETRICS & GYNECOLOGY

## 2023-01-16 PROCEDURE — 76210000017 HC OR PH I REC 1.5 TO 2 HR: Performed by: OBSTETRICS & GYNECOLOGY

## 2023-01-16 PROCEDURE — 81025 URINE PREGNANCY TEST: CPT

## 2023-01-16 PROCEDURE — 74011000250 HC RX REV CODE- 250: Performed by: OBSTETRICS & GYNECOLOGY

## 2023-01-16 PROCEDURE — 74011250636 HC RX REV CODE- 250/636: Performed by: NURSE ANESTHETIST, CERTIFIED REGISTERED

## 2023-01-16 PROCEDURE — 77030008603 HC TRCR ENDOSC EPATH J&J -C: Performed by: OBSTETRICS & GYNECOLOGY

## 2023-01-16 PROCEDURE — 2709999900 HC NON-CHARGEABLE SUPPLY: Performed by: OBSTETRICS & GYNECOLOGY

## 2023-01-16 PROCEDURE — 77030041236 HC APPL SURG ENDO JNJ -B: Performed by: OBSTETRICS & GYNECOLOGY

## 2023-01-16 PROCEDURE — 76060000034 HC ANESTHESIA 1.5 TO 2 HR: Performed by: OBSTETRICS & GYNECOLOGY

## 2023-01-16 PROCEDURE — 77030013079 HC BLNKT BAIR HGGR 3M -A: Performed by: STUDENT IN AN ORGANIZED HEALTH CARE EDUCATION/TRAINING PROGRAM

## 2023-01-16 PROCEDURE — 74011000250 HC RX REV CODE- 250: Performed by: NURSE ANESTHETIST, CERTIFIED REGISTERED

## 2023-01-16 PROCEDURE — 77030002933 HC SUT MCRYL J&J -A: Performed by: OBSTETRICS & GYNECOLOGY

## 2023-01-16 PROCEDURE — 88305 TISSUE EXAM BY PATHOLOGIST: CPT

## 2023-01-16 RX ORDER — OXYCODONE HYDROCHLORIDE 5 MG/1
TABLET ORAL
Status: COMPLETED
Start: 2023-01-16 | End: 2023-01-16

## 2023-01-16 RX ORDER — SODIUM CHLORIDE 0.9 % (FLUSH) 0.9 %
5-40 SYRINGE (ML) INJECTION AS NEEDED
Status: DISCONTINUED | OUTPATIENT
Start: 2023-01-16 | End: 2023-01-16 | Stop reason: HOSPADM

## 2023-01-16 RX ORDER — LIDOCAINE HYDROCHLORIDE 20 MG/ML
INJECTION, SOLUTION EPIDURAL; INFILTRATION; INTRACAUDAL; PERINEURAL AS NEEDED
Status: DISCONTINUED | OUTPATIENT
Start: 2023-01-16 | End: 2023-01-16 | Stop reason: HOSPADM

## 2023-01-16 RX ORDER — HYDROMORPHONE HYDROCHLORIDE 2 MG/ML
INJECTION, SOLUTION INTRAMUSCULAR; INTRAVENOUS; SUBCUTANEOUS AS NEEDED
Status: DISCONTINUED | OUTPATIENT
Start: 2023-01-16 | End: 2023-01-16 | Stop reason: HOSPADM

## 2023-01-16 RX ORDER — HYDROMORPHONE HYDROCHLORIDE 1 MG/ML
0.2 INJECTION, SOLUTION INTRAMUSCULAR; INTRAVENOUS; SUBCUTANEOUS
Status: DISCONTINUED | OUTPATIENT
Start: 2023-01-16 | End: 2023-01-16 | Stop reason: HOSPADM

## 2023-01-16 RX ORDER — SODIUM CHLORIDE 0.9 % (FLUSH) 0.9 %
5-40 SYRINGE (ML) INJECTION EVERY 8 HOURS
Status: DISCONTINUED | OUTPATIENT
Start: 2023-01-16 | End: 2023-01-16 | Stop reason: HOSPADM

## 2023-01-16 RX ORDER — SODIUM CHLORIDE, SODIUM LACTATE, POTASSIUM CHLORIDE, CALCIUM CHLORIDE 600; 310; 30; 20 MG/100ML; MG/100ML; MG/100ML; MG/100ML
50 INJECTION, SOLUTION INTRAVENOUS CONTINUOUS
Status: DISCONTINUED | OUTPATIENT
Start: 2023-01-16 | End: 2023-01-16 | Stop reason: HOSPADM

## 2023-01-16 RX ORDER — ONDANSETRON 2 MG/ML
4 INJECTION INTRAMUSCULAR; INTRAVENOUS AS NEEDED
Status: DISCONTINUED | OUTPATIENT
Start: 2023-01-16 | End: 2023-01-16 | Stop reason: HOSPADM

## 2023-01-16 RX ORDER — FENTANYL CITRATE 50 UG/ML
INJECTION, SOLUTION INTRAMUSCULAR; INTRAVENOUS AS NEEDED
Status: DISCONTINUED | OUTPATIENT
Start: 2023-01-16 | End: 2023-01-16 | Stop reason: HOSPADM

## 2023-01-16 RX ORDER — DEXAMETHASONE SODIUM PHOSPHATE 4 MG/ML
INJECTION, SOLUTION INTRA-ARTICULAR; INTRALESIONAL; INTRAMUSCULAR; INTRAVENOUS; SOFT TISSUE AS NEEDED
Status: DISCONTINUED | OUTPATIENT
Start: 2023-01-16 | End: 2023-01-16 | Stop reason: HOSPADM

## 2023-01-16 RX ORDER — ONDANSETRON 2 MG/ML
INJECTION INTRAMUSCULAR; INTRAVENOUS AS NEEDED
Status: DISCONTINUED | OUTPATIENT
Start: 2023-01-16 | End: 2023-01-16 | Stop reason: HOSPADM

## 2023-01-16 RX ORDER — LIDOCAINE HYDROCHLORIDE 10 MG/ML
0.1 INJECTION, SOLUTION EPIDURAL; INFILTRATION; INTRACAUDAL; PERINEURAL AS NEEDED
Status: DISCONTINUED | OUTPATIENT
Start: 2023-01-16 | End: 2023-01-16 | Stop reason: HOSPADM

## 2023-01-16 RX ORDER — FENTANYL CITRATE 50 UG/ML
25 INJECTION, SOLUTION INTRAMUSCULAR; INTRAVENOUS
Status: COMPLETED | OUTPATIENT
Start: 2023-01-16 | End: 2023-01-16

## 2023-01-16 RX ORDER — OXYCODONE HYDROCHLORIDE 5 MG/1
5 TABLET ORAL ONCE
Status: COMPLETED | OUTPATIENT
Start: 2023-01-16 | End: 2023-01-16

## 2023-01-16 RX ORDER — ACETAMINOPHEN 325 MG/1
650 TABLET ORAL ONCE
Status: DISCONTINUED | OUTPATIENT
Start: 2023-01-16 | End: 2023-01-16 | Stop reason: HOSPADM

## 2023-01-16 RX ORDER — BUPIVACAINE HYDROCHLORIDE 5 MG/ML
INJECTION, SOLUTION EPIDURAL; INTRACAUDAL AS NEEDED
Status: DISCONTINUED | OUTPATIENT
Start: 2023-01-16 | End: 2023-01-16 | Stop reason: HOSPADM

## 2023-01-16 RX ORDER — MIDAZOLAM HYDROCHLORIDE 1 MG/ML
INJECTION, SOLUTION INTRAMUSCULAR; INTRAVENOUS AS NEEDED
Status: DISCONTINUED | OUTPATIENT
Start: 2023-01-16 | End: 2023-01-16 | Stop reason: HOSPADM

## 2023-01-16 RX ORDER — TRAMADOL HYDROCHLORIDE 50 MG/1
50 TABLET ORAL
Qty: 15 TABLET | Refills: 0 | Status: SHIPPED | OUTPATIENT
Start: 2023-01-16 | End: 2023-01-19

## 2023-01-16 RX ORDER — PROPOFOL 10 MG/ML
INJECTION, EMULSION INTRAVENOUS AS NEEDED
Status: DISCONTINUED | OUTPATIENT
Start: 2023-01-16 | End: 2023-01-16 | Stop reason: HOSPADM

## 2023-01-16 RX ORDER — DEXMEDETOMIDINE HYDROCHLORIDE 100 UG/ML
INJECTION, SOLUTION INTRAVENOUS AS NEEDED
Status: DISCONTINUED | OUTPATIENT
Start: 2023-01-16 | End: 2023-01-16 | Stop reason: HOSPADM

## 2023-01-16 RX ORDER — SUCCINYLCHOLINE CHLORIDE 20 MG/ML
INJECTION INTRAMUSCULAR; INTRAVENOUS AS NEEDED
Status: DISCONTINUED | OUTPATIENT
Start: 2023-01-16 | End: 2023-01-16 | Stop reason: HOSPADM

## 2023-01-16 RX ORDER — ROCURONIUM BROMIDE 10 MG/ML
INJECTION, SOLUTION INTRAVENOUS AS NEEDED
Status: DISCONTINUED | OUTPATIENT
Start: 2023-01-16 | End: 2023-01-16 | Stop reason: HOSPADM

## 2023-01-16 RX ADMIN — SUCCINYLCHOLINE CHLORIDE 160 MG: 20 INJECTION, SOLUTION INTRAMUSCULAR; INTRAVENOUS at 07:36

## 2023-01-16 RX ADMIN — LIDOCAINE HYDROCHLORIDE 80 MG: 20 INJECTION, SOLUTION EPIDURAL; INFILTRATION; INTRACAUDAL; PERINEURAL at 07:36

## 2023-01-16 RX ADMIN — HYDROMORPHONE HYDROCHLORIDE 0.2 MG: 1 INJECTION, SOLUTION INTRAMUSCULAR; INTRAVENOUS; SUBCUTANEOUS at 10:10

## 2023-01-16 RX ADMIN — CEFOXITIN SODIUM 2 G: 2 POWDER, FOR SOLUTION INTRAVENOUS at 07:51

## 2023-01-16 RX ADMIN — HYDROMORPHONE HYDROCHLORIDE 0.5 MG: 2 INJECTION, SOLUTION INTRAMUSCULAR; INTRAVENOUS; SUBCUTANEOUS at 08:49

## 2023-01-16 RX ADMIN — FENTANYL CITRATE 25 MCG: 50 INJECTION, SOLUTION INTRAMUSCULAR; INTRAVENOUS at 09:25

## 2023-01-16 RX ADMIN — FENTANYL CITRATE 25 MCG: 50 INJECTION, SOLUTION INTRAMUSCULAR; INTRAVENOUS at 09:20

## 2023-01-16 RX ADMIN — HYDROMORPHONE HYDROCHLORIDE 0.2 MG: 1 INJECTION, SOLUTION INTRAMUSCULAR; INTRAVENOUS; SUBCUTANEOUS at 10:20

## 2023-01-16 RX ADMIN — ONDANSETRON HYDROCHLORIDE 4 MG: 2 SOLUTION INTRAMUSCULAR; INTRAVENOUS at 10:00

## 2023-01-16 RX ADMIN — ONDANSETRON HYDROCHLORIDE 4 MG: 2 INJECTION, SOLUTION INTRAMUSCULAR; INTRAVENOUS at 07:49

## 2023-01-16 RX ADMIN — FENTANYL CITRATE 25 MCG: 50 INJECTION, SOLUTION INTRAMUSCULAR; INTRAVENOUS at 09:30

## 2023-01-16 RX ADMIN — SODIUM CHLORIDE, POTASSIUM CHLORIDE, SODIUM LACTATE AND CALCIUM CHLORIDE: 600; 310; 30; 20 INJECTION, SOLUTION INTRAVENOUS at 07:24

## 2023-01-16 RX ADMIN — FENTANYL CITRATE 100 MCG: 50 INJECTION, SOLUTION INTRAMUSCULAR; INTRAVENOUS at 07:36

## 2023-01-16 RX ADMIN — HYDROMORPHONE HYDROCHLORIDE 0.5 MG: 2 INJECTION, SOLUTION INTRAMUSCULAR; INTRAVENOUS; SUBCUTANEOUS at 08:21

## 2023-01-16 RX ADMIN — HYDROMORPHONE HYDROCHLORIDE 0.5 MG: 2 INJECTION, SOLUTION INTRAMUSCULAR; INTRAVENOUS; SUBCUTANEOUS at 09:12

## 2023-01-16 RX ADMIN — MIDAZOLAM 2 MG: 1 INJECTION INTRAMUSCULAR; INTRAVENOUS at 07:23

## 2023-01-16 RX ADMIN — ROCURONIUM BROMIDE 5 MG: 10 SOLUTION INTRAVENOUS at 07:36

## 2023-01-16 RX ADMIN — HYDROMORPHONE HYDROCHLORIDE 0.2 MG: 1 INJECTION, SOLUTION INTRAMUSCULAR; INTRAVENOUS; SUBCUTANEOUS at 10:40

## 2023-01-16 RX ADMIN — SUGAMMADEX 200 MG: 100 INJECTION, SOLUTION INTRAVENOUS at 08:55

## 2023-01-16 RX ADMIN — HYDROMORPHONE HYDROCHLORIDE 0.2 MG: 1 INJECTION, SOLUTION INTRAMUSCULAR; INTRAVENOUS; SUBCUTANEOUS at 10:30

## 2023-01-16 RX ADMIN — OXYCODONE HYDROCHLORIDE 5 MG: 5 TABLET ORAL at 10:40

## 2023-01-16 RX ADMIN — HYDROMORPHONE HYDROCHLORIDE 0.5 MG: 2 INJECTION, SOLUTION INTRAMUSCULAR; INTRAVENOUS; SUBCUTANEOUS at 08:53

## 2023-01-16 RX ADMIN — DEXMEDETOMIDINE HYDROCHLORIDE 10 MCG: 100 INJECTION, SOLUTION, CONCENTRATE INTRAVENOUS at 07:52

## 2023-01-16 RX ADMIN — FENTANYL CITRATE 25 MCG: 50 INJECTION, SOLUTION INTRAMUSCULAR; INTRAVENOUS at 09:45

## 2023-01-16 RX ADMIN — DEXAMETHASONE SODIUM PHOSPHATE 8 MG: 4 INJECTION, SOLUTION INTRAMUSCULAR; INTRAVENOUS at 07:49

## 2023-01-16 RX ADMIN — PROPOFOL 200 MG: 10 INJECTION, EMULSION INTRAVENOUS at 07:36

## 2023-01-16 RX ADMIN — HYDROMORPHONE HYDROCHLORIDE 0.2 MG: 1 INJECTION, SOLUTION INTRAMUSCULAR; INTRAVENOUS; SUBCUTANEOUS at 10:00

## 2023-01-16 RX ADMIN — MIDAZOLAM 2 MG: 1 INJECTION INTRAMUSCULAR; INTRAVENOUS at 07:29

## 2023-01-16 RX ADMIN — ONDANSETRON HYDROCHLORIDE 4 MG: 2 INJECTION, SOLUTION INTRAMUSCULAR; INTRAVENOUS at 08:53

## 2023-01-16 RX ADMIN — ROCURONIUM BROMIDE 45 MG: 10 SOLUTION INTRAVENOUS at 07:49

## 2023-01-16 NOTE — H&P
[facility] [address]ias [address], [address]  P [phone]  F [phone]    Office Note  Patient ID:  Name:  Devi Moreno  MRN:  [id number]  :  1975/47 y.o. Date:  2023      HISTORY OF PRESENT ILLNESS:  Ms. Devi Moreno is a 56 y.o. female who presents as a new patient from Dr. Grimaldo  for germline BRCA-2 mutation (heterozygous for c.6124C>T ([id number]). The patient's family history is significant for breast cancer, which led to her obtaining genetic testing. Referred to our office for further discussion and management. She is otherwise without complaints. Imaging Review:   Pelvic ultrasound 2022:   Impression: Uterus present, mid division, size 8.9cm. Endometrium 4.4mm. Cervix normal and culdesac no fluid. IUD is noted in expected position. Unable to discretely visualize right ovary, but no adnexal mass or cyst.  Unable to discretely visualize left ovary, but no adnexal mass or cyst.     ROS:  A comprehensive review of systems was negative except for that written in the History of Present Illness.  , 10 point ROS      ECOG stGstrstastdstest:st st1st Problem List:  Patient Active Problem List    Diagnosis Date Noted    Migraines 10/12/2022    Mild asthma 10/12/2022    BRCA2 gene mutation positive in female 10/12/2022    Encounter for cosmetic surgery 2021     PMH:  Past Medical History:   Diagnosis Date    BRCA2 genetic carrier     COVID-19 vaccine series completed     Hx of seasonal allergies     Migraines     Mild asthma     Sleep apnea       PSH:  Past Surgical History:   Procedure Laterality Date    BX OF BREAST, NEEDLE CORE, IMAGE GUIDE Right     HX  SECTION      x 2, ,    HX GASTRIC BYPASS  10/2018    SLLEVE    HX HERNIA REPAIR  2021    HX HYSTEROSCOPY WITH ENDOMETRIAL ABLATION      HX LAP GASTRIC BYPASS      HX OTHER SURGICAL      abdominalplasty    NV UNLISTED PROCEDURE ABDOMEN PERITONEUM & OMENTUM   ABDOMINOPLASTY      Social History:  Social History     Tobacco Use    Smoking status: Never    Smokeless tobacco: Never   Substance Use Topics    Alcohol use: Yes     Comment: occ      Family History:  Family History   Problem Relation Age of Onset    Breast Cancer Mother     Breast Cancer Maternal Aunt         X2    Anesth Problems Neg Hx       Medications: (reviewed)  Current Facility-Administered Medications   Medication Dose Route Frequency    cefOXitin (MEFOXIN) 2 g in sterile water (preservative free) 20 mL iv syringe  2 g IntraVENous ON CALL TO OR     Allergies: (reviewed)  Allergies   Allergen Reactions    Nsaids (Non-Steroidal Anti-Inflammatory Drug) Other (comments)     Previous bariatric surgery 2018        Gyn History:   Last pap: 10/2020 normal per patient  History of abnormal pap: denies    OBJECTIVE:    Physical Exam:  VITAL SIGNS: Vitals:    01/16/23 0614   BP: 133/76   Pulse: 71   Resp: 18   Temp: 98.1 °F (36.7 °C)   SpO2: 98%   Weight: 252 lb (114.3 kg)   Height: 5' 6\" (1.676 m)     Body mass index is 40.67 kg/m². GENERAL TERRI: Conversant, alert, oriented. No acute distress. HEENT: HEENT. No thyroid enlargement. No JVD. Neck: Supple without restrictions. RESPIRATORY: Clear to auscultation and percussion to the bases. No CVAT. CARDIOVASC: RRR without murmur/rub. GASTROINT: soft, non-tender, without masses or organomegaly   MUSCULOSKEL: no joint tenderness, deformity or swelling       EXTREMITIES: extremities normal, atraumatic, no cyanosis or edema   PELVIC: Exam chaperoned by nurse. Normal appearing external genitalia. On speculum exam, normal appearing vagina and cervix. On bimanual exam, the cervix and uterus are normal size and mobile. No evidence of adnexal masses or nodularity. RECTAL: deferred   DARRICK SURVEY: No suspicious lymphadenopathy or edema noted. NEURO: Grossly intact. No acute deficit.        Lab Date as available:    Lab Results   Component Value Date/Time    WBC 8.0 01/09/2023 03:09 PM    HGB 12.5 01/09/2023 03:09 PM    HCT 39.1 01/09/2023 03:09 PM    PLATELET 134 24/34/0491 03:09 PM    MCV 92.0 01/09/2023 03:09 PM     Lab Results   Component Value Date/Time    Sodium 139 01/09/2023 03:09 PM    Potassium 4.2 01/09/2023 03:09 PM    Chloride 107 01/09/2023 03:09 PM    CO2 29 01/09/2023 03:09 PM    Anion gap 3 (L) 01/09/2023 03:09 PM    Glucose 94 01/09/2023 03:09 PM    BUN 13 01/09/2023 03:09 PM    Creatinine 0.84 01/09/2023 03:09 PM    BUN/Creatinine ratio 15 01/09/2023 03:09 PM    GFR est AA >60 07/26/2021 01:54 PM    GFR est non-AA >60 07/26/2021 01:54 PM    Calcium 9.3 01/09/2023 03:09 PM         IMPRESSION/PLAN:    Ms. Emerita Navarrete is a 56 y.o. female who presents with germline BRCA-2 mutation (heterozygous for c.6124C>T ([id number]). Family History of breast cancer    Problems:     Patient Active Problem List    Diagnosis Date Noted    Migraines 10/12/2022    Mild asthma 10/12/2022    BRCA2 gene mutation positive in female 10/12/2022    Encounter for cosmetic surgery 08/04/2021       I reviewed Ms. Dirk Murillo's course to date, including her medical records, recent studies, physical exam, and review of symptoms. Reviewed NCCN guidelines and recommendations for germline BRCA-2 mutations. Provided patient with reading material from NCCN. Discussed her increased risk of ovarian cancer, approximately 15-40%. Discussed guidelines for surveillance versus risk-reducing BSO. Given the patient's age and the fact that she is done with child-bearing, I have recommended a laparoscopic risk-reducing BSO. The patient inquired about a hysterectomy. There is no increased risk of endometrial/uterine cancer with BRCA-2 mutations, and it is unlikely for insurance to approve a hysterectomy as she has no other indications for a hysterectomy. Plan for laparoscopic BSO, possible laparotomy. Reviewed risks, benefits, indications, and alternatives of surgery.  Will post for surgery at the patient's convenience. Will collect CBC, CMP, CXR, and EKG prior to surgery. All questions and concerns were addressed with the patient and she is comfortable with the plan. Defined Sensitive Document    >50% of total time allocated to visit dedicated to counseling, 60 minutes total.    Signed By: Steve Miller MD     1/16/2023/11:16 AM       Date of Surgery Update: Katelyn Basurto was seen and examined. History and physical has been reviewed. The patient has been examined.  There have been no significant clinical changes since the completion of the originally dated History and Physical.    Signed By: Steve Miller MD     January 16, 2023 6:59 AM

## 2023-01-16 NOTE — OP NOTES
Gynecologic Oncology Operative Report    Anitra Sebastian  2023    PREOPERATIVE DIAGNOSIS:  1) Germline BRCA-2 mutation; 2) Breast cancer    POSTOPERATIVE DIAGNOSIS:  1) Germline BRCA-2 mutation; 2) Breast cancer    PROCEDURE:  Diagnostic laparoscopy, bilateral salpingo-oophorectomy    Surgeon:  Kris Maria MD    Assistant:  Leo Gaming     Anesthesia:  General endotracheal anesthesia    EBL:  <10 cc    Preoperative antibiotics: Cefoxitin 2 grams IV    VTE Prophylaxis: SCDs    Complications:  None    Specimens: bilateral fallopian tubes and ovaries    Operative indications:  56 y.o. female with BRCA-2 germline mutation and breast cancer     Operative findings: On laparoscopic survey, difficult to insufflate given prior abdominoplasty and tight fascia. Normal appearing uterus with adhesions along the bladder secondary to prior . Small adhesions of the omentum to the anterior abdominal wall. Normal appearing bilateral tubes/ and ovaries. Operative note:  After the risks, benefits, indications, and alternatives of the procedure were discussed with the patient and informed consent was obtained, the patient was taken to the operating room. She was positively identified, administered general anesthesia, and then placed in the dorsal lithotomy position in [address] [place] [address]. An exam under anesthesia was performed with the above findings. She was then prepped and draped in the usual fashion. A freeman catheter was inserted. An 98IF umbilical incision was then made with #15-blade and carried down to the underlying fascia. The fascia was incised and the peritoneal cavity entered via an open Sophie technique. 10-mm balloon trocar was then placed and intra-peritoneal placement confirmed via direct visualization. The abdomen was surveyed with the above findings. The abdomen was then insufflated with CO2 to a pressure of 15mmHg.  Bilateral right and left lower quadrant 5-mm trocars were then inserted under direct visualization. The patient was placed in Trendelenburg. The omental adhesions were taken down with the Enseal device. Attention was then turned to the pelvis. Bilateral pelvic side-walls were entered and the bilateral ureters identified and preserved. Bilateral infundibulopelvic ligaments were skeletonized, then sealed and divided with the EnSeal device. Dissection was carried along the mesosalpinx to the level of the uterine fundus. The tube and utero-ovarian ligament were then sealed and divided with the EnSeal device at the level of the uterine fundus. The specimens were then placed into a 10-mm EndoCatch bag and removed via the umbilical incision. The intra-abdominal pressure was then decreased and all planes of dissection were hemostatic. SurgiFlo powder was placed along all planes of dissection. The lateral trocars were removed under direct visualization. The insufflation was released prior to removal of the umbilical port-site. The umbilical fascia was reapproximated with a figure-of-8 stitch using 0-Vicryl on a UR-6 needle. All skin incisions were closed 4-0 monocryl subcuticular stitch. Skin glue was applied to all skin incisions. The patient was taken out of stirrups, awakened from anesthesia, and taken to the recovery room in stable condition. The patient tolerated the procedure well. All instrument, sponge, and needle counts were correct.         Selina Coronel MD  1/16/2023  9:00 AM

## 2023-01-16 NOTE — DISCHARGE INSTRUCTIONS
[facility] GYNECOLOGIC ONCOLOGY  A department of [facility]  [address] [facility], [address], [address]  P [phone]  F [phone]       [address] AM. MAY HAVE NEXT PRESCRIPTION PAIN MEDICATION DOSE PER PRESCRIPTION. MAY HAVE TYLENOL AT ANY TIME. DISCHARGE SUMMARY from Nurse    The following personal items collected during your admission are returned to you:   Dental Appliance: Dental Appliances: None  Vision:    Hearing Aid:    Jewelry: Jewelry: None  Clothing: Clothing: Footwear, Pants, Shirt (clothing to [employer])  Other Valuables: Other Valuables: Other (comment) (CPAP to pacu)  Valuables sent to safe: ALL CLOTHING/VALUABLES RETURNED TO PATIENT BEFORE D/C HOME    PATIENT INSTRUCTIONS:    The first meal should be something that is light; not greasy or spicy. If this is tolerated, then advance to regular diet as tolerated. Anesthesia Discharge Instructions    After general anesthesia or intervenous sedation, for 24 hours or while taking prescription Narcotics:  Limit your activities  Do not drive or operate hazardous machinery  If you have not urinated within 8 hours after discharge, please contact your surgeon on call. Do not make important personal or business decisions  Do not drink alcoholic beverages    Report the following to your surgeon:  Excessive pain, swelling, redness or odor of or around the surgical area  Temperature over 100.5 degrees  Nausea and vomiting lasting longer than 4 hours or if unable to take medication  Any signs of decreased circulation or nerve impairment to extremity:  Change in color, persistent numbness, tingling, coldness or increased pain. Any questions    Pain  Take pain medication as directed by your doctor   Call your doctor if pain is NOT relieved by medication  DO NOT take aspirin or blood thinners until directed by your doctor          Dear Ms. Gaspar Claude,      Please review your instructions with your nurse and ask any questions so you have all the information you need to recover well at home. If you do not feel you have everything you need to succeed and be safe after you leave the hospital, please discuss these concerns with your nurse. As always, call for any questions at home. Your doctor: Genna Pete MD   Diagnosis: [place] [id number]  Procedure: Procedure(s):  LAPAROSCOPIC BILATERAL SALPINGO-OOPHORECTOMY  Date of Discharge: 01/16/23       Take Home Medications     See Discharge Medication Review provided to you by your nurse. If you did not receive one, request this prior to your discharge. It is important that you take your medications as they are prescribed. Your prescriptions are sent to your pharmacy on record. Keep your medications in the bottles provided by the pharmacist and keep a list of the medication names, dosages, times to be taken and what they are for in your wallet. Do not take other medications without consulting your doctor. You may take acetaminophen (Tylenol) and ibuprofen (Advil) for pain. If this is not sufficient for your pain, take tramadol or other pain medication you were provided. You should take a daily gentle stool softener such as a colace pill or dulcolax suppository for constipation as this is not uncommon after surgery and/or while on pain medication. If not prescribed, this can be found over the counter. If constipation persists for >24 hours you should take a dose of Milk of Magnesia. Call if your constipation continues. Diet    Stay hydrated and drink fluids such as gatorade and water. This will also help prevent constipation and dehydration. Limit somewhat any usual caffeine intake of beverages such as soda, tea and coffee as this may serve to dehydrate you. For the first 2-3 days keep a low fiber diet avoiding raw vegetables or fruits with skin. A diet consisting of soup, cereal, yogurt, eggs, fish, Boost/Ensure.  Avoid fatty/greasy foods. If nauseated, keep your diet limited to liquids and call if this persists. Activity    If possible, have someone with you at all times until you feel stable. Gradually increase your activity each day. There are generally no restrictions on walking, climbing stairs or riding in a car. Walk at least 4 times per day. Walking will help reduce the risk of blood clots and constipation. Showers are okay. If you have an incision, no tub bathing/swimming for two weeks. No driving for 2 week and/or while on pain medication. The following restrictions apply:  No heavy lifting greater than 15 lbs for 4 weeks, then 25 lbs for the next 4 weeks. If you had any vaginal procedure or a hysterectomy, nothing per vagina for 6-8 weeks. You may experience vaginal spotting. Should the bleeding require more that 4 pads a day please call our office. Incision    You should expect some discomfort in the area of your incision, particularly as you increase your activity. If you notice an area of increasing redness or new drainage, please call your doctor. Your incisions will have buried, absorbable sutures, which do not need to be removed and are covered by protective glue. Please allow this to fall off on its own over time and refrain from peeling it off unless it is no longer covering the incision. Causes For Concern    If any of the following occur, please call our office and speak with the Nurse/aid who will help you with your problem or ask the doctor to call you. Problems with the incision, including increasing pain, swelling, redness or drainage. Inability to pass urine   Increasing abdominal pain despite medication  Persisting nausea or vomiting. Fever or chills and a temperature >101F  Constipation (no bowel movement for three days). Diarrhea (more than three watery stools within 24 hours). Excessive vaginal or wound bleeding.   If after hours and you cannot reach an on-call physician, call 911. Follow-Up    Call [phone] to schedule the following appointments with Dr. Christen Pereyra:  Telephone virtual-visit in 10-14 days to discuss your pathology results. In-office visit for your postoperative exam in 6 weeks from surgery. Information obtained by :  I understand that if any problems occur once I am at home I am to contact my physician. I understand and acknowledge receipt of the instructions indicated above.                                                                                                                                            Physician's or R.N.'s Signature                                                                  Date/Time                                                                                                                                              Patient or Representative Signature                                                          Date/Time

## 2023-01-16 NOTE — BRIEF OP NOTE
Brief Postoperative Note    Patient: Pina Dominguez  YOB: 1975  MRN: [id number]    Date of Procedure: 2023     Pre-Op Diagnosis: BRCCA 2    Post-Op Diagnosis: Same as preoperative diagnosis. Procedure(s):  LAPAROSCOPIC BILATERAL SALPINGO-OOPHORECTOMY    Surgeon(s): Januray Morris MD    Surgical Assistant: Surg Asst-1: Tristian Romero    Anesthesia: General     Estimated Blood Loss (mL): Minimal    Complications: None    Specimens:   ID Type Source Tests Collected by Time Destination   1 : BILATERAL FALLOPIAN TUBES; BILATERAL OVARIES Fresh Fallopian Tube  January Morris MD 2023 0741 Pathology        Implants: * No implants in log *    Drains: * No LDAs found *    Findings: On laparoscopic survey, difficult to insufflate given prior abdominoplasty and tight fascia. Normal appearing uterus with adhesions along the bladder secondary to prior . Small adhesions of the omentum to the anterior abdominal wall. Normal appearing bilateral tubes/ and ovaries.      Electronically Signed by Lorie Ashley MD on 2023 at 8:59 AM

## 2023-01-16 NOTE — ANESTHESIA POSTPROCEDURE EVALUATION
Post-Anesthesia Evaluation and Assessment    Patient: Melly Lemus MRN: [id number]  SSN: [id number]    YOB: 1975  Age: 56 y.o. Sex: female      I have evaluated the patient and they are stable and ready for discharge from the PACU. Cardiovascular Function/Vital Signs  Visit Vitals  BP (!) 151/61   Pulse 63   Temp 36.7 °C (98.1 °F)   Resp 22   Ht 5' 6\" (1.676 m)   Wt 114.3 kg (252 lb)   SpO2 99%   BMI 40.67 kg/m²       Patient is status post General anesthesia for Procedure(s):  LAPAROSCOPIC BILATERAL SALPINGO-OOPHORECTOMY. Nausea/Vomiting: None    Postoperative hydration reviewed and adequate. Pain:  Pain Scale 1: Numeric (0 - 10) (01/16/23 1045)  Pain Intensity 1: 5 (01/16/23 1045)   Managed    Neurological Status:   Neuro (WDL): Within Defined Limits (01/16/23 1045)  Neuro  Neurologic State: Alert (01/16/23 1045)  LUE Motor Response: Purposeful (01/16/23 1045)  LLE Motor Response: Purposeful (01/16/23 1045)  RUE Motor Response: Purposeful (01/16/23 1045)  RLE Motor Response: Purposeful (01/16/23 1045)   At baseline    Mental Status, Level of Consciousness: Alert and  oriented to person, place, and time    Pulmonary Status:   O2 Device: None (Room air) (01/16/23 1000)   Adequate oxygenation and airway patent    Complications related to anesthesia: None    Post-anesthesia assessment completed. No concerns      Signed By: Perico Mcneil DO     January 16, 2023                Procedure(s):  LAPAROSCOPIC BILATERAL SALPINGO-OOPHORECTOMY. general    <BSHSIANPOST>    INITIAL Post-op Vital signs:   Vitals Value Taken Time   /87 01/16/23 1100   Temp 36.7 °C (98.1 °F) 01/16/23 0913   Pulse 68 01/16/23 1112   Resp 21 01/16/23 1112   SpO2 98 % 01/16/23 1112   Vitals shown include unvalidated device data.

## 2023-01-16 NOTE — ANESTHESIA PREPROCEDURE EVALUATION
Anesthetic History   No history of anesthetic complications            Review of Systems / Medical History  Patient summary reviewed and pertinent labs reviewed    Pulmonary        Sleep apnea    Asthma : well controlled       Neuro/Psych   Within defined limits           Cardiovascular  Within defined limits                     GI/Hepatic/Renal  Within defined limits              Endo/Other        Morbid obesity     Other Findings              Physical Exam    Airway  Mallampati: II    Neck ROM: normal range of motion   Mouth opening: Normal     Cardiovascular    Rhythm: regular  Rate: normal         Dental    Dentition: Lower dentition intact and Upper dentition intact     Pulmonary  Breath sounds clear to auscultation               Abdominal  GI exam deferred       Other Findings            Anesthetic Plan    ASA: 3  Anesthesia type: general          Induction: Intravenous  Anesthetic plan and risks discussed with: Patient

## 2023-01-16 NOTE — PERIOP NOTES
[id number] - Patient's family member Raheemmack Malik) called and updated on patient progression and start of procedure.

## 2023-01-16 NOTE — PERIOP NOTES
Surgicel powder added to sterile field for topical application by MD. Lot #: [id number]. Exp. Date: 04-.

## 2023-01-16 NOTE — PERIOP NOTES
Patient: Elroy Rangel MRN: [id number]  SSN: [id number]   YOB: 1975  Age: 56 y.o. Sex: female     Patient is status post Procedure(s):  LAPAROSCOPIC BILATERAL SALPINGO-OOPHORECTOMY. Surgeon(s) and Role:     * Alea Nagel MD - Primary    Local/Dose/Irrigation:  see emar                  Peripheral IV 01/16/23 Posterior;Proximal;Right Forearm (Active)   Site Assessment Clean, dry, & intact 01/16/23 0642   Phlebitis Assessment 0 01/16/23 0642   Infiltration Assessment 0 01/16/23 0642   Dressing Status Clean, dry, & intact 01/16/23 0642   Dressing Type Transparent;Tape 01/16/23 0053   Hub Color/Line Status Pink; Infusing 01/16/23 0642            Airway - Endotracheal Tube 01/16/23 Oral (Active)                   Dressing/Packing:  Incision 01/16/23 Abdomen-Dressing/Treatment: Skin glue (01/16/23 7169)  Incision 01/16/23 Vagina-Dressing/Treatment: Shashank  (01/16/23 4255)

## 2023-01-30 NOTE — PROGRESS NOTES
Virtual Post op Visit to discuss pathology report, surgery was on 1/16/2023    1. Have you been to the ER, urgent care clinic since your last visit? Hospitalized since your last visit? Yes, surgery with Dr. Guy Rubio on 1/16/2023    2. Have you seen or consulted any other health care providers outside of the [address] [place] [address] since your last visit? Include any pap smears or colon screening.    no

## 2023-01-31 ENCOUNTER — VIRTUAL VISIT (OUTPATIENT)
Dept: GYNECOLOGY | Age: 48
End: 2023-01-31
Payer: COMMERCIAL

## 2023-01-31 DIAGNOSIS — Z15.02 BRCA2 GENE MUTATION POSITIVE IN FEMALE: Primary | ICD-10-CM

## 2023-01-31 DIAGNOSIS — Z15.09 BRCA2 GENE MUTATION POSITIVE IN FEMALE: Primary | ICD-10-CM

## 2023-01-31 DIAGNOSIS — Z15.01 BRCA2 GENE MUTATION POSITIVE IN FEMALE: Primary | ICD-10-CM

## 2023-01-31 PROCEDURE — 99024 POSTOP FOLLOW-UP VISIT: CPT | Performed by: OBSTETRICS & GYNECOLOGY

## 2023-01-31 NOTE — PROGRESS NOTES
[address] [facility] [address], [address] [facility]  P [phone]  F [phone]    Office Note  Patient ID:  Name:  Darlyn Mcneil  MRN:  [id number]  :  1975/47 y.o. Date:  2023      HISTORY OF PRESENT ILLNESS:  Ms. Darlyn Mcneil is a 56 y.o. female who presented with germline BRCA-2 mutation (heterozygous for c.6124C>T ([id number]). Family History of breast cancer. On 2023 underwent Diagnostic laparoscopy, bilateral salpingo-oophorectomy. Final pathology benign. Presents today via virtual visit for pathology review. Doing well without issues. Initial History:  Ms. Darlyn Mcneil is a 56 y.o. female who presents as a new patient from Dr. Mariajose Matthews for germline BRCA-2 mutation (heterozygous for c.6124C>T ([id number]). The patient's family history is significant for breast cancer, which led to her obtaining genetic testing. Referred to our office for further discussion and management. She is otherwise without complaints. Pathology Review:  2023:  * * *FINAL PATHOLOGIC DIAGNOSIS* * *        Ovaries and fallopian tubes, bilateral salpingo-oophorectomy:        Bilateral ovaries with follicular and cortical inclusion cysts and   age-related changes   Bilateral fallopian tubes with no significant abnormality     Imaging Review:   Pelvic ultrasound 2022:   Impression: Uterus present, mid division, size 8.9cm. Endometrium 4.4mm. Cervix normal and culdesac no fluid. IUD is noted in expected position. Unable to discretely visualize right ovary, but no adnexal mass or cyst.  Unable to discretely visualize left ovary, but no adnexal mass or cyst.     ROS:  A comprehensive review of systems was negative except for that written in the History of Present Illness.  , 10 point ROS      ECOG stGstrstastdstest:st st1st Problem List:  Patient Active Problem List    Diagnosis Date Noted    Migraines 10/12/2022    Mild asthma 10/12/2022    BRCA2 gene mutation positive in female 10/12/2022    Encounter for cosmetic surgery 2021     PMH:  Past Medical History:   Diagnosis Date    BRCA2 genetic carrier     COVID-19 vaccine series completed     Hx of seasonal allergies     Migraines     Mild asthma     Sleep apnea       PSH:  Past Surgical History:   Procedure Laterality Date    BX OF BREAST, NEEDLE CORE, IMAGE GUIDE Right     HX  SECTION      x 2, ,    HX GASTRIC BYPASS  10/2018    SLLEVE    HX HERNIA REPAIR  2021    HX HYSTEROSCOPY WITH ENDOMETRIAL ABLATION      HX LAP GASTRIC BYPASS      HX OTHER SURGICAL      abdominalplasty    [place] UNLISTED PROCEDURE ABDOMEN PERITONEUM & OMENTUM      ABDOMINOPLASTY      Social History:  Social History     Tobacco Use    Smoking status: Never    Smokeless tobacco: Never   Substance Use Topics    Alcohol use: Yes     Comment: occ      Family History:  Family History   Problem Relation Age of Onset    Breast Cancer Mother     Breast Cancer Maternal Aunt         X2    Anesth Problems Neg Hx       Medications: (reviewed)  Current Outpatient Medications   Medication Sig    fluticasone-umeclidinium-vilanterol (Trelegy Ellipta) 100-62.5-25 mcg inhaler Take 1 Puff by inhalation daily. albuterol (PROVENTIL HFA, VENTOLIN HFA, PROAIR HFA) 90 mcg/actuation inhaler Take 2 Puffs by inhalation every six (6) hours as needed for Wheezing. levocetirizine (XYZAL) 5 mg tablet Take 5 mg by mouth daily. cholecalciferol (VITAMIN D3) 25 mcg (1,000 unit) cap Take 2,000 Units by mouth daily. No current facility-administered medications for this visit. Allergies: (reviewed)  Allergies   Allergen Reactions    Nsaids (Non-Steroidal Anti-Inflammatory Drug) Other (comments)     Previous bariatric surgery 2018        Gyn History:   Last pap: 10/2020 normal per patient  History of abnormal pap: denies    OBJECTIVE:    Physical Exam:  VITAL SIGNS: There were no vitals filed for this visit.     There is no height or weight on file to calculate BMI. GENERAL TERRI: Conversant, alert, oriented. No acute distress. HEENT: HEENT. No thyroid enlargement. No JVD. Neck: Supple without restrictions. RESPIRATORY: Clear to auscultation and percussion to the bases. No CVAT. CARDIOVASC: RRR without murmur/rub. GASTROINT: soft, non-tender, without masses or organomegaly   MUSCULOSKEL: no joint tenderness, deformity or swelling       EXTREMITIES: extremities normal, atraumatic, no cyanosis or edema   PELVIC: Exam chaperoned by nurse. Normal appearing external genitalia. On speculum exam, normal appearing vagina and cervix. On bimanual exam, the cervix and uterus are normal size and mobile. No evidence of adnexal masses or nodularity. RECTAL: deferred   DARRICK SURVEY: No suspicious lymphadenopathy or edema noted. NEURO: Grossly intact. No acute deficit. Lab Date as available:    Lab Results   Component Value Date/Time    WBC 8.0 01/09/2023 03:09 PM    HGB 12.5 01/09/2023 03:09 PM    HCT 39.1 01/09/2023 03:09 PM    PLATELET 397 31/06/0340 03:09 PM    MCV 92.0 01/09/2023 03:09 PM     Lab Results   Component Value Date/Time    Sodium 139 01/09/2023 03:09 PM    Potassium 4.2 01/09/2023 03:09 PM    Chloride 107 01/09/2023 03:09 PM    CO2 29 01/09/2023 03:09 PM    Anion gap 3 (L) 01/09/2023 03:09 PM    Glucose 94 01/09/2023 03:09 PM    BUN 13 01/09/2023 03:09 PM    Creatinine 0.84 01/09/2023 03:09 PM    BUN/Creatinine ratio 15 01/09/2023 03:09 PM    GFR est AA >60 07/26/2021 01:54 PM    GFR est non-AA >60 07/26/2021 01:54 PM    Calcium 9.3 01/09/2023 03:09 PM         IMPRESSION/PLAN:    Ms. Vidhi Sharma is a 56 y.o. female who presented with germline BRCA-2 mutation (heterozygous for c.6124C>T ([id number]). Family History of breast cancer. On 1/16/2023 underwent Diagnostic laparoscopy, bilateral salpingo-oophorectomy. Final pathology benign.      Problems:     Patient Active Problem List    Diagnosis Date Noted    Migraines 10/12/2022 Mild asthma 10/12/2022    BRCA2 gene mutation positive in female 10/12/2022    Encounter for cosmetic surgery 08/04/2021     Reviewed patient's course to date, including her benign surgical pathology. She reports healing well from surgery. RTC in 4 weeks for postoperative visit. At that time, given her benign pathology, the patient may be discharged from Gynecologic Oncology clinic. Encouraged patient to follow-up with her PCP and ObGyn for continued routine health care maintenance. All questions and concerns were addressed with the patient and she is comfortable with the plan. An electronic signature was used to authenticate this note. Bambi Alvarado MD    Pursuant to the emergency declaration unde the [facility] waiver authority and the [employer] Act, this Virtual Visit was conducted, with patient's consent, to reduce the patient's risk of exposure to COVID-19 and provide continuity of care for an established patient. Patient identification was verified at the start of the visit: Yes    Services were provided through a video synchronous discussion virtually to substitute for in-person clinic visit. Patient was at her individual home, while the provider was in his/her respective office.     I spent at least 25 minutes with this established patient, and >50% of the time was spent counseling and/or coordinating care regarding pathology review    Bambi Alvarado MD

## 2023-02-28 NOTE — PROGRESS NOTES
Post op Visit, surgery was on 1/16/2023    1. Have you been to the ER, urgent care clinic since your last visit? Hospitalized since your last visit? Yes, surgery with Dr. Felix Silverman on 1/16/2023    2. Have you seen or consulted any other health care providers outside of the [address] [place] [address] since your last visit? Include any pap smears or colon screening.    no

## 2023-03-01 ENCOUNTER — OFFICE VISIT (OUTPATIENT)
Dept: GYNECOLOGY | Age: 48
End: 2023-03-01
Payer: COMMERCIAL

## 2023-03-01 VITALS
BODY MASS INDEX: 40.02 KG/M2 | SYSTOLIC BLOOD PRESSURE: 124 MMHG | HEART RATE: 70 BPM | DIASTOLIC BLOOD PRESSURE: 59 MMHG | HEIGHT: 66 IN | WEIGHT: 249 LBS

## 2023-03-01 DIAGNOSIS — Z15.01 BRCA2 GENE MUTATION POSITIVE IN FEMALE: Primary | ICD-10-CM

## 2023-03-01 DIAGNOSIS — Z15.02 BRCA2 GENE MUTATION POSITIVE IN FEMALE: Primary | ICD-10-CM

## 2023-03-01 DIAGNOSIS — Z15.09 BRCA2 GENE MUTATION POSITIVE IN FEMALE: Primary | ICD-10-CM

## 2023-03-01 PROCEDURE — 99024 POSTOP FOLLOW-UP VISIT: CPT | Performed by: OBSTETRICS & GYNECOLOGY

## 2023-03-01 NOTE — PROGRESS NOTES
[address] [place] [address] Mathias Moritz 728, 7[address] [facility]  P [phone]  F [phone]    Office Note  Patient ID:  Name:  Yesenia Cohen  MRN:  [id number]  :  1975/47 y.o. Date:  3/1/2023      HISTORY OF PRESENT ILLNESS:  Ms. Yeseina Cohen is a 56 y.o. female who presented with germline BRCA-2 mutation (heterozygous for c.6124C>T ([id number]). Family History of breast cancer. On 2023 underwent Diagnostic laparoscopy, bilateral salpingo-oophorectomy. Final pathology benign. Presents today for postoperative visit. Doing well without issues. Initial History:  Ms. Yesenia Cohen is a 56 y.o. female who presents as a new patient from Dr. Abbey Delgado for germline BRCA-2 mutation (heterozygous for c.6124C>T ([id number]). The patient's family history is significant for breast cancer, which led to her obtaining genetic testing. Referred to our office for further discussion and management. She is otherwise without complaints. Pathology Review:  2023:  * * *FINAL PATHOLOGIC DIAGNOSIS* * *        Ovaries and fallopian tubes, bilateral salpingo-oophorectomy:        Bilateral ovaries with follicular and cortical inclusion cysts and   age-related changes   Bilateral fallopian tubes with no significant abnormality     Imaging Review:   Pelvic ultrasound 2022:   Impression: Uterus present, mid division, size 8.9cm. Endometrium 4.4mm. Cervix normal and culdesac no fluid. IUD is noted in expected position. Unable to discretely visualize right ovary, but no adnexal mass or cyst.  Unable to discretely visualize left ovary, but no adnexal mass or cyst.     ROS:  A comprehensive review of systems was negative except for that written in the History of Present Illness.  , 10 point ROS      ECOG stGstrstastdstest:st st1st Problem List:  Patient Active Problem List    Diagnosis Date Noted    Migraines 10/12/2022    Mild asthma 10/12/2022    BRCA2 gene mutation positive in female 10/12/2022 Encounter for cosmetic surgery 2021     PMH:  Past Medical History:   Diagnosis Date    BRCA2 genetic carrier     COVID-19 vaccine series completed     Hx of seasonal allergies     Migraines     Mild asthma     Sleep apnea       PSH:  Past Surgical History:   Procedure Laterality Date    BX OF BREAST, NEEDLE CORE, IMAGE GUIDE Right     HX  SECTION      x 2, ,    HX GASTRIC BYPASS  10/2018    SLLEVE    HX HERNIA REPAIR  2021    HX HYSTEROSCOPY WITH ENDOMETRIAL ABLATION      HX LAP GASTRIC BYPASS      HX OTHER SURGICAL      abdominalplasty    ME UNLISTED PROCEDURE ABDOMEN PERITONEUM & OMENTUM      ABDOMINOPLASTY      Social History:  Social History     Tobacco Use    Smoking status: Never    Smokeless tobacco: Never   Substance Use Topics    Alcohol use: Yes     Comment: occ      Family History:  Family History   Problem Relation Age of Onset    Breast Cancer Mother     Breast Cancer Maternal Aunt         X2    Anesth Problems Neg Hx       Medications: (reviewed)  Current Outpatient Medications   Medication Sig    fluticasone-umeclidinium-vilanterol (Trelegy Ellipta) 100-62.5-25 mcg inhaler Take 1 Puff by inhalation daily. albuterol (PROVENTIL HFA, VENTOLIN HFA, PROAIR HFA) 90 mcg/actuation inhaler Take 2 Puffs by inhalation every six (6) hours as needed for Wheezing. levocetirizine (XYZAL) 5 mg tablet Take 5 mg by mouth daily. cholecalciferol (VITAMIN D3) 25 mcg (1,000 unit) cap Take 2,000 Units by mouth daily. No current facility-administered medications for this visit.      Allergies: (reviewed)  Allergies   Allergen Reactions    Nsaids (Non-Steroidal Anti-Inflammatory Drug) Other (comments)     Previous bariatric surgery 2018        Gyn History:   Last pap: 10/2020 normal per patient  History of abnormal pap: denies    OBJECTIVE:  Physical Exam:  Visit Vitals  BP (!) 124/59 (BP 1 Location: Left upper arm, BP Patient Position: Sitting)   Pulse 70   Ht 5' 6\" (1.676 m)   Wt 249 lb (112.9 kg)   BMI 40.19 kg/m²      General: Alert and oriented. No acute distress. Well-nourished  Gastrointestinal: soft, non-tender, non-distended, no masses or organomegaly. Well-healed incision. Musculoskeletal: normal gait. No joint tenderness, deformity or swelling. No muscular tenderness. Extremities: extremities normal, atraumatic, no cyanosis or edema. Pelvic: exam chaperoned by nurse. Normal appearing external genitalia. On speculum exam, the vagina is atrophic. The uterus and cervix are surgically absent. No evidence of masses or nodularity on bimanual exam. Deferred rectovaginal exam.   Neuro: Grossly intact. Normal gait and movement. No acute deficit  Skin: No evidence of rashes or skin changes. IMPRESSION/PLAN:    Ms. Ruben Ruelas is a 56 y.o. female who presented with germline BRCA-2 mutation (heterozygous for c.6124C>T ([id number]). Family History of breast cancer. On 1/16/2023 underwent Diagnostic laparoscopy, bilateral salpingo-oophorectomy. Final pathology benign. Problems:     Patient Active Problem List    Diagnosis Date Noted    Migraines 10/12/2022    Mild asthma 10/12/2022    BRCA2 gene mutation positive in female 10/12/2022    Encounter for cosmetic surgery 08/04/2021     Reviewed patient's course to date, including her benign surgical pathology. She has healed from surgery. Given her benign pathology, the patient may be discharged from Gynecologic Oncology clinic. Encouraged patient to follow-up with her PCP and ObGyn for continued routine health care maintenance. All questions and concerns were addressed with the patient and she is comfortable with the plan. An electronic signature was used to authenticate this note.      Edu Dodson MD

## 2025-03-25 NOTE — BRIEF OP NOTE
Brief Postoperative Note    Patient: Daria Dallas  YOB: 1975  MRN: [id number]    Date of Procedure: 8/4/2021     Pre-Op Diagnosis: COSMETIC    Post-Op Diagnosis: Same as preoperative diagnosis.       Procedure(s):  FULL ABDOMINOPLASTY    Surgeon(s):  MD Jack Boykin MD    Surgical Assistant: Surg Asst-1: Luke Brar    Anesthesia: General     Estimated Blood Loss (mL): less than 909     Complications: None    Specimens:   ID Type Source Tests Collected by Time Destination   1 : EXCISED HERNIA SAC Preservative Abdomen  Ladan Dee MD 8/4/2021 1412 Pathology        Implants: * No implants in log *    Drains:   Abrahan-Santiago Drain 08/04/21 Right Abdomen (Active)       Abrahan-Santiago Drain 08/04/21 Left Abdomen (Active)       Findings: ventral hernia 5x5cm    Electronically Signed by Brendan Coates MD on 8/4/2021 at 3:35 PM Acute mental status changes

## (undated) DEVICE — Device

## (undated) DEVICE — AGENT HEMSTAT 3GM OXIDIZED REGENERATED CELOS ABSRB FOR CONT (ORDER MULTIPLES OF 5EA)

## (undated) DEVICE — SUTURE MCRYL SZ 5-0 L18IN ABSRB UD L19MM PS-2 3/8 CIR PRIM Y495G

## (undated) DEVICE — GLOVE SURG SZ 7 L12IN FNGR THK79MIL GRN LTX FREE

## (undated) DEVICE — BLANKET WRM W35.4XL86.6IN FULL UNDERBODY + FORC AIR

## (undated) DEVICE — SUTURE PDS II SZ 0 L27IN ABSRB VLT L36MM CT-1 1/2 CIR Z340H

## (undated) DEVICE — TROCAR ENDOSCP L100MM DIA5MM BLDELSS STBL SL THRD OPT VW

## (undated) DEVICE — INTENDED FOR TISSUE SEPARATION, AND OTHER PROCEDURES THAT REQUIRE A SHARP SURGICAL BLADE TO PUNCTURE OR CUT.: Brand: BARD-PARKER ® CARBON RIB-BACK BLADES

## (undated) DEVICE — SUTURE MCRYL SZ 4-0 L27IN ABSRB UD L19MM PS-2 1/2 CIR PRIM Y426H

## (undated) DEVICE — ASPIRATION TUBING SET, DISPOSABLE: Brand: MICROAIRE®

## (undated) DEVICE — SUTURE MCRYL SZ 3-0 L27IN ABSRB UD L19MM PS-2 3/8 CIR PRIM Y427H

## (undated) DEVICE — CANISTER, RIGID, 3000CC: Brand: MEDLINE INDUSTRIES, INC.

## (undated) DEVICE — PLASTICS -SFMC: Brand: MEDLINE INDUSTRIES, INC.

## (undated) DEVICE — GLOVE ORANGE PI 7 1/2   MSG9075

## (undated) DEVICE — SURGICEL ENDOSCP APPL

## (undated) DEVICE — PACK,UNIVERSAL,NO GOWNS: Brand: MEDLINE

## (undated) DEVICE — GYN LAPAROSCOPY - SMH: Brand: MEDLINE INDUSTRIES, INC.

## (undated) DEVICE — BINDER ABD S/M 12X30-45IN --

## (undated) DEVICE — DRAPE FLD WRM W44XL66IN C6L FOR INTRATEMP SYS THERMABASIN

## (undated) DEVICE — RESERVOIR,SUCTION,100CC,SILICONE: Brand: MEDLINE

## (undated) DEVICE — SOL INJ SOD CL0.9% 10ML PREFIL --

## (undated) DEVICE — SOLUTION IRRIG 1000ML 0.9% SOD CHL USP POUR PLAS BTL

## (undated) DEVICE — SPONGE GZ W4XL4IN COT 12 PLY TYP VII WVN C FLD DSGN

## (undated) DEVICE — COVER LT HNDL PLAS RIG 1 PER PK

## (undated) DEVICE — BAG SPEC REM 224ML W4XL6IN DIA10MM 1 HND GYN DISP ENDOPCH

## (undated) DEVICE — DRESSING FOAM DISK DIA1IN H 7MM HYDRPHLC CHG IMPREG IN SL

## (undated) DEVICE — SUTURE MCRYL SZ 2-0 L27IN ABSRB UD SH L26MM TAPERPOINT NDL Y417H

## (undated) DEVICE — TOWEL SURG W17XL27IN STD BLU COT NONFENESTRATED PREWASHED

## (undated) DEVICE — SUT PROL 1 30IN CT1 BLU --

## (undated) DEVICE — TUBING SUCT L9FT FOR AUTOFUSE INFLTR SYS

## (undated) DEVICE — PAD PT POS 36 IN SURGYPAD DISP

## (undated) DEVICE — GLOVE ORANGE PI 7   MSG9070

## (undated) DEVICE — TOTAL TRAY, 16FR 10ML SIL FOLEY, URN: Brand: MEDLINE

## (undated) DEVICE — SUT ETHLN 3-0 18IN PS2 BLK --

## (undated) DEVICE — GLOVE SURG SZ 65 THK91MIL LTX FREE SYN POLYISOPRENE

## (undated) DEVICE — SYSTEM EVAC SMOKE LAPARSCOPIC

## (undated) DEVICE — 3M™ TEGADERM™ TRANSPARENT FILM DRESSING FRAME STYLE, 1626W, 4 IN X 4-3/4 IN (10 CM X 12 CM), 50/CT 4CT/CASE: Brand: 3M™ TEGADERM™

## (undated) DEVICE — TROCARS: Brand: KII® BALLOON BLUNT TIP SYSTEM

## (undated) DEVICE — STAPLER SKIN H3.9MM WIRE DIA0.58MM CRWN 6.9MM 35 STPL ROT

## (undated) DEVICE — TUBING, SUCTION, 1/4" X 10', STRAIGHT: Brand: MEDLINE

## (undated) DEVICE — TROCAR ENDOSCP L100MM DIA5MM BLDELSS STBL SL OBT RADLUC

## (undated) DEVICE — 450 ML BOTTLE OF 0.05% CHLORHEXIDINE GLUCONATE IN 99.95% STERILE WATER FOR IRRIGATION, USP AND APPLICATOR.: Brand: IRRISEPT ANTIMICROBIAL WOUND LAVAGE

## (undated) DEVICE — DRESSING,GAUZE,XEROFORM,CURAD,1"X8",ST: Brand: CURAD

## (undated) DEVICE — SEALER TISS L37CM SHFT DIA5MM 360DEG ROT CVD JAW TAPR TIP

## (undated) DEVICE — YANKAUER,BULB TIP,W/O VENT,RIGID,STERILE: Brand: MEDLINE

## (undated) DEVICE — Z DISCONTINUED USE (MFG CAT 7984-37) SOLUTION IV SODIUM CHL 0.9% 100 ML INJ

## (undated) DEVICE — TRAY PREP DRY W/ PREM GLV 2 APPL 6 SPNG 2 UNDPD 1 OVERWRAP

## (undated) DEVICE — SYR 50ML LR LCK 1ML GRAD NSAF --

## (undated) DEVICE — DRAIN SURG 19FR 100% SIL RADPQ RND CHN FULL FLUT

## (undated) DEVICE — PLASMABLADE PS210-030S 3.0S LOCK: Brand: PLASMABLADE™

## (undated) DEVICE — REM POLYHESIVE ADULT PATIENT RETURN ELECTRODE: Brand: VALLEYLAB

## (undated) DEVICE — GOWN,SIRUS,NONRNF,SETINSLV,2XL,18/CS: Brand: MEDLINE

## (undated) DEVICE — BLADE ASSEMB CLP HAIR FINE --

## (undated) DEVICE — SUTURE SZ 0 27IN 5/8 CIR UR-6  TAPER PT VIOLET ABSRB VICRYL J603H

## (undated) DEVICE — SUTURE VCRL SZ 2-0 L54IN ABSRB UD LIGAPAK REEL NDL J286G

## (undated) DEVICE — BANDAGE COBAN 4 IN COMPR W4INXL5YD FOAM COHESIVE QUIK STK SELF ADH SFT

## (undated) DEVICE — TRANSFER SET 3": Brand: MEDLINE INDUSTRIES, INC.